# Patient Record
Sex: FEMALE | Race: WHITE | NOT HISPANIC OR LATINO | Employment: FULL TIME | URBAN - METROPOLITAN AREA
[De-identification: names, ages, dates, MRNs, and addresses within clinical notes are randomized per-mention and may not be internally consistent; named-entity substitution may affect disease eponyms.]

---

## 2017-04-11 ENCOUNTER — HOSPITAL ENCOUNTER (OUTPATIENT)
Dept: RADIOLOGY | Facility: HOSPITAL | Age: 42
Discharge: HOME | End: 2017-04-11
Attending: OBSTETRICS & GYNECOLOGY
Payer: COMMERCIAL

## 2017-04-11 DIAGNOSIS — Z12.31 VISIT FOR SCREENING MAMMOGRAM: ICD-10-CM

## 2017-04-11 PROCEDURE — 77067 SCR MAMMO BI INCL CAD: CPT | Mod: TC

## 2018-06-04 ENCOUNTER — OFFICE VISIT (OUTPATIENT)
Dept: PODIATRY | Facility: CLINIC | Age: 43
End: 2018-06-04
Payer: COMMERCIAL

## 2018-06-04 VITALS
HEART RATE: 82 BPM | SYSTOLIC BLOOD PRESSURE: 152 MMHG | DIASTOLIC BLOOD PRESSURE: 94 MMHG | WEIGHT: 270 LBS | HEIGHT: 67 IN | BODY MASS INDEX: 42.38 KG/M2

## 2018-06-04 DIAGNOSIS — S99.922A INJURY OF PLANTAR PLATE, LEFT, INITIAL ENCOUNTER: ICD-10-CM

## 2018-06-04 DIAGNOSIS — M62.469 GASTROCNEMIUS EQUINUS, UNSPECIFIED LATERALITY: ICD-10-CM

## 2018-06-04 DIAGNOSIS — G57.53 TARSAL TUNNEL SYNDROME OF BOTH LOWER EXTREMITIES: ICD-10-CM

## 2018-06-04 DIAGNOSIS — M79.671 BILATERAL FOOT PAIN: Primary | ICD-10-CM

## 2018-06-04 DIAGNOSIS — M79.672 BILATERAL FOOT PAIN: Primary | ICD-10-CM

## 2018-06-04 DIAGNOSIS — R20.0 NUMBNESS AND TINGLING OF FOOT: ICD-10-CM

## 2018-06-04 DIAGNOSIS — Z87.39 HISTORY OF BACK PAIN: ICD-10-CM

## 2018-06-04 DIAGNOSIS — R20.2 NUMBNESS AND TINGLING OF FOOT: ICD-10-CM

## 2018-06-04 DIAGNOSIS — M20.5X2 HALLUX LIMITUS, LEFT: ICD-10-CM

## 2018-06-04 DIAGNOSIS — Z98.890 HISTORY OF FOOT SURGERY: ICD-10-CM

## 2018-06-04 PROCEDURE — 99999 PR PBB SHADOW E&M-EST. PATIENT-LVL III: CPT | Mod: PBBFAC,,, | Performed by: PODIATRIST

## 2018-06-04 PROCEDURE — 99203 OFFICE O/P NEW LOW 30 MIN: CPT | Mod: S$GLB,,, | Performed by: PODIATRIST

## 2018-06-04 PROCEDURE — 3008F BODY MASS INDEX DOCD: CPT | Mod: CPTII,S$GLB,, | Performed by: PODIATRIST

## 2018-06-04 RX ORDER — ESTRADIOL 2 MG/1
TABLET ORAL
COMMUNITY
Start: 2017-04-14 | End: 2020-01-31 | Stop reason: SDUPTHER

## 2018-06-04 RX ORDER — LEVOTHYROXINE SODIUM 75 UG/1
TABLET ORAL
COMMUNITY
Start: 2017-04-14 | End: 2022-12-28 | Stop reason: ALTCHOICE

## 2018-06-04 RX ORDER — HYDROCHLOROTHIAZIDE 12.5 MG/1
CAPSULE ORAL
COMMUNITY
Start: 2017-04-05 | End: 2020-01-31 | Stop reason: SDUPTHER

## 2018-06-04 RX ORDER — TRAZODONE HYDROCHLORIDE 50 MG/1
TABLET ORAL
COMMUNITY
Start: 2017-04-14 | End: 2020-01-31 | Stop reason: SDUPTHER

## 2018-06-04 NOTE — PROGRESS NOTES
"Subjective:      Patient ID: Isi Herrera is a 42 y.o. female.    Chief Complaint: Numbness (Bilateral)    History right foot surgery bunionectomy per Dr. Guzman with subsequent surgery per Dr. Johnson consisting of hardware removal with implantation of silastic toe spacer in great toe joint. Both physicians in [place]. C/o protruding implant right foot. C/o numbness along bottom of both feet mostly at night but also acts up during the day. History of bulging disk in low back with pain improving. Followed [facility] in [place], [place].    Vitals:    06/04/18 1020   BP: (!) 152/94   Pulse: 82   Weight: 122.5 kg (270 lb)   Height: 5' 7" (1.702 m)   PainSc: 0-No pain      History reviewed. No pertinent past medical history.    Past Surgical History:   Procedure Laterality Date    BUNIONECTOMY Right 2009    HYSTERECTOMY         Family History   Problem Relation Age of Onset    Graves' disease Mother     Arthritis Father     Diabetes Sister        Social History     Social History    Marital status:      Spouse name: N/A    Number of children: N/A    Years of education: N/A     Social History Main Topics    Smoking status: Never Smoker    Smokeless tobacco: None    Alcohol use No    Drug use: No    Sexual activity: Yes     Other Topics Concern    None     Social History Narrative    None       Current Outpatient Prescriptions   Medication Sig Dispense Refill    estradiol (ESTRACE) 2 MG tablet       hydroCHLOROthiazide (MICROZIDE) 12.5 mg capsule       levothyroxine (SYNTHROID) 75 MCG tablet       traZODone (DESYREL) 50 MG tablet        No current facility-administered medications for this visit.        Review of patient's allergies indicates:  No Known Allergies      Review of Systems   Constitution: Negative for chills, fever, weakness and malaise/fatigue.   Cardiovascular: Negative for chest pain, claudication and leg swelling.   Respiratory: Negative for cough and shortness of " breath.    Skin: Negative for color change, itching, poor wound healing and rash.   Musculoskeletal: Positive for back pain. Negative for joint pain, muscle cramps and muscle weakness.   Gastrointestinal: Negative for nausea and vomiting.   Neurological: Positive for numbness. Negative for paresthesias.   Psychiatric/Behavioral: Negative for altered mental status.           Objective:      Physical Exam   Constitutional: She is oriented to person, place, and time. She appears well-developed and well-nourished. No distress.   Cardiovascular:   Pulses:       Dorsalis pedis pulses are 2+ on the right side, and 2+ on the left side.        Posterior tibial pulses are 2+ on the right side, and 2+ on the left side.   CFT< 3 secs all toes bilateral foot, skin temp warm bilateral foot, diminished digital hair growth bilateral foot, no lower extremity edema bilateral.     Musculoskeletal:        Feet:    - 20 deg DF with knee extended bilateral that reduces to 0 deg DF with knee flexed.    + Tinel's sign with percussion of TT that radiates to her toes bilateral.     Left 1 MTP achieves 20 deg DF. Dorsal medial palpable bony prominence first met head left.    Medial deviation of toes 2-5 bilateral foot.   Neurological: She is alert and oriented to person, place, and time. She has normal strength. No sensory deficit.   Skin: Skin is warm, dry and intact. Capillary refill takes less than 2 seconds. No ecchymosis and no rash noted. She is not diaphoretic. No cyanosis or erythema. No pallor. Nails show no clubbing.   Skin turgor and elasticity WNLs bilateral lower extremity    No open lesions or macerations bilateral lower extremity.               Assessment:       Encounter Diagnoses   Name Primary?    Bilateral foot pain Yes    Numbness and tingling of foot     Tarsal tunnel syndrome of both lower extremities     History of back pain     History of foot surgery - Right Foot     Hallux limitus, left     Injury of plantar  "plate, left, initial encounter     Gastrocnemius equinus, unspecified laterality          Plan:       Isi was seen today for numbness.    Diagnoses and all orders for this visit:    Bilateral foot pain  -     X-Ray Foot Complete 3 view Bilateral; Future    Numbness and tingling of foot  -     Ambulatory Referral to Neurology    Tarsal tunnel syndrome of both lower extremities  -     X-Ray Foot Complete 3 view Bilateral; Future    History of back pain  -     Ambulatory Referral to Neurology    History of foot surgery - Right Foot  -     X-Ray Foot Complete 3 view Bilateral; Future    Hallux limitus, left  -     X-Ray Foot Complete 3 view Bilateral; Future    Injury of plantar plate, left, initial encounter  -     X-Ray Foot Complete 3 view Bilateral; Future    Gastrocnemius equinus, unspecified laterality      I counseled the patient on her conditions, their implications and medical management.    Dispensed literature on and demonstrated calf muscle stretches. Reviewed appropriate shoe gear and discussed activity modification such as avoiding flat shoes and barefoot walking. Recommend 1/2-1" heel height.    Refer to Neurology for second opinion on etiology of bilateral foot numbness. Clinically may be double crush syndrome vs isolated lumbar sacral disease or tarsal tunnel.    X-ray bilateral foot to assess underlying disease for possible surgical intervention.    RTC 2-3 weeks or prn.    .         "

## 2018-06-05 ENCOUNTER — HOSPITAL ENCOUNTER (OUTPATIENT)
Dept: RADIOLOGY | Facility: HOSPITAL | Age: 43
Discharge: HOME | End: 2018-06-05
Attending: PODIATRIST
Payer: COMMERCIAL

## 2018-06-05 DIAGNOSIS — M20.5X2 HALLUX LIMITUS, LEFT: ICD-10-CM

## 2018-06-05 DIAGNOSIS — M79.672 BILATERAL FOOT PAIN: ICD-10-CM

## 2018-06-05 DIAGNOSIS — M79.671 BILATERAL FOOT PAIN: ICD-10-CM

## 2018-06-05 DIAGNOSIS — G57.53 TARSAL TUNNEL SYNDROME OF BOTH LOWER EXTREMITIES: ICD-10-CM

## 2018-06-05 DIAGNOSIS — S99.922A INJURY OF PLANTAR PLATE, LEFT, INITIAL ENCOUNTER: ICD-10-CM

## 2018-06-05 DIAGNOSIS — Z98.890 HISTORY OF FOOT SURGERY: ICD-10-CM

## 2018-06-05 PROCEDURE — 73630 X-RAY EXAM OF FOOT: CPT | Mod: 50,TC,FY

## 2018-06-05 PROCEDURE — 73630 X-RAY EXAM OF FOOT: CPT | Mod: 26,50,, | Performed by: RADIOLOGY

## 2018-06-15 ENCOUNTER — OFFICE VISIT (OUTPATIENT)
Dept: PODIATRY | Facility: CLINIC | Age: 43
End: 2018-06-15
Payer: COMMERCIAL

## 2018-06-15 ENCOUNTER — LAB VISIT (OUTPATIENT)
Dept: LAB | Facility: HOSPITAL | Age: 43
End: 2018-06-15
Attending: PODIATRIST
Payer: COMMERCIAL

## 2018-06-15 VITALS
BODY MASS INDEX: 42.38 KG/M2 | SYSTOLIC BLOOD PRESSURE: 135 MMHG | HEART RATE: 71 BPM | HEIGHT: 67 IN | WEIGHT: 270 LBS | DIASTOLIC BLOOD PRESSURE: 81 MMHG

## 2018-06-15 DIAGNOSIS — Z01.818 PREOP EXAMINATION: ICD-10-CM

## 2018-06-15 DIAGNOSIS — Z98.890 HISTORY OF FOOT SURGERY: ICD-10-CM

## 2018-06-15 DIAGNOSIS — M89.9 DISORDER OF BONE: ICD-10-CM

## 2018-06-15 DIAGNOSIS — M20.21 HALLUX RIGIDUS, RIGHT FOOT: Primary | ICD-10-CM

## 2018-06-15 DIAGNOSIS — M19.071 ARTHROSIS OF FOOT, RIGHT: ICD-10-CM

## 2018-06-15 LAB — 25(OH)D3+25(OH)D2 SERPL-MCNC: 56 NG/ML

## 2018-06-15 PROCEDURE — 36415 COLL VENOUS BLD VENIPUNCTURE: CPT | Mod: PO

## 2018-06-15 PROCEDURE — 99999 PR PBB SHADOW E&M-EST. PATIENT-LVL IV: CPT | Mod: PBBFAC,,, | Performed by: PODIATRIST

## 2018-06-15 PROCEDURE — 99214 OFFICE O/P EST MOD 30 MIN: CPT | Mod: S$GLB,,, | Performed by: PODIATRIST

## 2018-06-15 PROCEDURE — 82306 VITAMIN D 25 HYDROXY: CPT

## 2018-06-15 PROCEDURE — 3008F BODY MASS INDEX DOCD: CPT | Mod: CPTII,S$GLB,, | Performed by: PODIATRIST

## 2018-06-15 RX ORDER — LIDOCAINE HYDROCHLORIDE 10 MG/ML
1 INJECTION, SOLUTION EPIDURAL; INFILTRATION; INTRACAUDAL; PERINEURAL ONCE
Status: CANCELLED | OUTPATIENT
Start: 2018-06-15 | End: 2018-06-15

## 2018-06-17 NOTE — PROGRESS NOTES
"Subjective:      Patient ID: Isi Herrera is a 42 y.o. female.    Chief Complaint: Follow-up (bilateral foot pain)    History right foot surgery bunionectomy per Dr. Guzman with subsequent surgery per Dr. Johnson consisting of hardware removal with implantation of silastic toe spacer in great toe joint. Both physicians in [place]. C/o protruding implant right foot. C/o numbness along bottom of both feet mostly at night but also acts up during the day. History of bulging disk in low back with pain improving. Followed [facility] in [place], [place].    Vitals:    06/15/18 1014   BP: 135/81   Pulse: 71   Weight: 122.5 kg (270 lb)   Height: 5' 7" (1.702 m)      No past medical history on file.    Past Surgical History:   Procedure Laterality Date    BUNIONECTOMY Right 2009    HYSTERECTOMY         Family History   Problem Relation Age of Onset    Graves' disease Mother     Arthritis Father     Diabetes Sister        Social History     Social History    Marital status:      Spouse name: N/A    Number of children: N/A    Years of education: N/A     Social History Main Topics    Smoking status: Never Smoker    Smokeless tobacco: Not on file    Alcohol use No    Drug use: No    Sexual activity: Yes     Other Topics Concern    Not on file     Social History Narrative    No narrative on file       Current Outpatient Prescriptions   Medication Sig Dispense Refill    estradiol (ESTRACE) 2 MG tablet       hydroCHLOROthiazide (MICROZIDE) 12.5 mg capsule       levothyroxine (SYNTHROID) 75 MCG tablet       traZODone (DESYREL) 50 MG tablet        No current facility-administered medications for this visit.        Review of patient's allergies indicates:  No Known Allergies      Review of Systems   Constitution: Negative for chills, fever, weakness and malaise/fatigue.   Cardiovascular: Negative for chest pain, claudication and leg swelling.   Respiratory: Negative for cough and shortness of " breath.    Skin: Negative for color change, itching, poor wound healing and rash.   Musculoskeletal: Positive for back pain. Negative for joint pain, muscle cramps and muscle weakness.   Gastrointestinal: Negative for nausea and vomiting.   Neurological: Positive for numbness. Negative for paresthesias.   Psychiatric/Behavioral: Negative for altered mental status.           Objective:      Physical Exam   Constitutional: She is oriented to person, place, and time. She appears well-developed and well-nourished. No distress.   Cardiovascular:   Pulses:       Dorsalis pedis pulses are 2+ on the right side, and 2+ on the left side.        Posterior tibial pulses are 2+ on the right side, and 2+ on the left side.   CFT< 3 secs all toes bilateral foot, skin temp warm bilateral foot, diminished digital hair growth bilateral foot, no lower extremity edema bilateral.     Musculoskeletal:        Feet:    - 20 deg DF with knee extended bilateral that reduces to 0 deg DF with knee flexed.    + Tinel's sign with percussion of TT that radiates to her toes bilateral.     Left 1 MTP achieves 20 deg DF. Dorsal medial palpable bony prominence first met head left.    Medial deviation of toes 2-5 bilateral foot.   Neurological: She is alert and oriented to person, place, and time. She has normal strength. No sensory deficit.   Skin: Skin is warm, dry and intact. Capillary refill takes less than 2 seconds. No ecchymosis and no rash noted. She is not diaphoretic. No cyanosis or erythema. No pallor. Nails show no clubbing.   Skin turgor and elasticity WNLs bilateral lower extremity    No open lesions or macerations bilateral lower extremity.               Assessment:       Encounter Diagnoses   Name Primary?    Hallux rigidus, right foot Yes    Arthrosis of foot, right     History of foot surgery - Right Foot     Preop examination     Disorder of bone          Plan:       Isi was seen today for follow-up.    Diagnoses and all  orders for this visit:    Hallux rigidus, right foot  -     Ambulatory referral to [facility]  -     Case Request Operating Room: FUSION, MTP JOINT  -     Place in Outpatient; Standing  -     Verify surgical site; Standing  -     Verify informed consent; Standing    Arthrosis of foot, right  -     Ambulatory referral to Family Practice  -     Case Request Operating Room: FUSION, MTP JOINT  -     Place in Outpatient; Standing  -     Verify surgical site; Standing  -     Verify informed consent; Standing    History of foot surgery - Right Foot  -     Ambulatory referral to [facility]  -     Case Request Operating Room: FUSION, MTP JOINT  -     Place in Outpatient; Standing  -     Verify surgical site; Standing  -     Verify informed consent; Standing    Preop examination  -     Ambulatory referral to [facility]    Disorder of bone  -     VITAMIN D; Future    Other orders  -     lidocaine (PF) 10 mg/ml (1%) injection 10 mg; Inject 1 mL (10 mg total) into the skin once.  -     ceFAZolin (ANCEF) 2 g in dextrose 5 % 50 mL IVPB; Inject 2 g into the vein On call Procedure (Surgery).      I counseled the patient on her conditions, their implications and medical management.    Reviewed right foot xray noting daphney metallic implant inserted to base of proximal phalanx left hallux with erosion of the central and lateral first met head, obliteration of the joint space and varus deviation of the toe.    Discussed continued conservative care vs surgical intervention consisting of implant removal and 1 MTP arthrodesis with possible autograft implantation from the calcaneus in detail. Associated risks, benefits and postop course discussed. No guarantees given or implied.    This procedure has been fully reviewed with the patient and written informed consent has been obtained.    Referred to PCP for medical optimization and risk stratification    Scheduled at [facility] with local on 8/15/18.    RTC 1 week  postop    .

## 2018-07-10 DIAGNOSIS — Z12.31 VISIT FOR SCREENING MAMMOGRAM: Primary | ICD-10-CM

## 2018-07-19 ENCOUNTER — HOSPITAL ENCOUNTER (OUTPATIENT)
Dept: RADIOLOGY | Facility: HOSPITAL | Age: 43
Discharge: HOME | End: 2018-07-19
Attending: OBSTETRICS & GYNECOLOGY
Payer: COMMERCIAL

## 2018-07-19 DIAGNOSIS — Z12.31 VISIT FOR SCREENING MAMMOGRAM: ICD-10-CM

## 2018-07-19 PROCEDURE — 77063 BREAST TOMOSYNTHESIS BI: CPT | Mod: 26,,, | Performed by: RADIOLOGY

## 2018-07-19 PROCEDURE — 77067 SCR MAMMO BI INCL CAD: CPT | Mod: 26,,, | Performed by: RADIOLOGY

## 2018-07-19 PROCEDURE — 77063 BREAST TOMOSYNTHESIS BI: CPT | Mod: TC

## 2018-08-13 ENCOUNTER — HOSPITAL ENCOUNTER (OUTPATIENT)
Dept: PREADMISSION TESTING | Facility: HOSPITAL | Age: 43
Discharge: HOME | End: 2018-08-13
Attending: PODIATRIST
Payer: COMMERCIAL

## 2018-08-13 ENCOUNTER — ANESTHESIA EVENT (OUTPATIENT)
Dept: SURGERY | Facility: HOSPITAL | Age: 43
End: 2018-08-13
Payer: COMMERCIAL

## 2018-08-13 VITALS
HEART RATE: 73 BPM | OXYGEN SATURATION: 95 % | DIASTOLIC BLOOD PRESSURE: 93 MMHG | RESPIRATION RATE: 18 BRPM | BODY MASS INDEX: 42.64 KG/M2 | WEIGHT: 271.69 LBS | TEMPERATURE: 99 F | HEIGHT: 67 IN | SYSTOLIC BLOOD PRESSURE: 138 MMHG

## 2018-08-13 DIAGNOSIS — Z01.818 PRE-OP TESTING: Primary | ICD-10-CM

## 2018-08-13 RX ORDER — LIDOCAINE HYDROCHLORIDE 10 MG/ML
1 INJECTION, SOLUTION EPIDURAL; INFILTRATION; INTRACAUDAL; PERINEURAL ONCE
Status: CANCELLED | OUTPATIENT
Start: 2018-08-13 | End: 2018-08-13

## 2018-08-13 RX ORDER — SODIUM CHLORIDE, SODIUM LACTATE, POTASSIUM CHLORIDE, CALCIUM CHLORIDE 600; 310; 30; 20 MG/100ML; MG/100ML; MG/100ML; MG/100ML
INJECTION, SOLUTION INTRAVENOUS CONTINUOUS
Status: CANCELLED | OUTPATIENT
Start: 2018-08-13

## 2018-08-13 NOTE — DISCHARGE INSTRUCTIONS
· Arrive on  8/15/2018  at 5:30 A.M.  · Report to the 2nd floor Procedural Check In Room .   · Nothing to eat or drink after midnight the night before your procedure.  · Take the LEVOTHYROXINE medications the morning of surgery with a small sip of water.                                                         · Please remove all body piercings and leave all your jewelery and valuables at home .  · Please remove your contact lenses.   · Wear loose comfortable clothing.  · You will not be able to drive that day, please make arrangement for transportation to and from your procedure.  · You cannot be alone for 24 hours after anesthesia. Make arrangements as needed.  · Shower the night before your procedure and the morning of your procedure with Hibiclens antibacterial solution.  · No lotions, creams or powders  · Stop Aspirin, Ibuprofen, Advil, Motrin, Aleve, Nuprin, Naprosyn (all NSAID Medication) or any other blood thinners 5 days before your procedure unless directed by your physician.  Also hold all over the counter vitamins and herbal supplements for 5 days prior to your procedure.  · You may take Tylenol or Acetaminophen up the day of surgery for any pain.        Call [phone] with any questions.            Pre-Op Bathing Instructions    Before surgery, you can play an important role in your own health.    Because skin is not sterile, we need to be sure that your skin is as free of germs as possible. By following the instructions below, you can reduce the number of germs on your skin before surgery.    IMPORTANT: You will need to shower with a special soap called Hibiclens*, available at any pharmacy, over the counter usually in the first aid isle.  If you are allergic to Chlorhexidine (the antiseptic in Hibiclens), use an antibacterial soap such as Dial Soap for your preoperative showers.  You will shower with Hibiclens the night before and the morning of surgery. Both the night before your surgery and the  morning of your surgery see steps 2 and 3.   Do not use Hibiclens on the head, face or genitals to avoid injury to those areas.    STEP #1  1.  Shower with Hibiclens solution night before and the morning of surgery.      STEP #2: THE NIGHT BEFORE YOUR SURGERY     1. Do not shave the area of your body where your surgery will be performed.  2. Wash your hair as usual with your normal  Shampoo. Wash body shoulder to toes with your normal soap.  3. Squeeze Hibiclens into hand apply to surgical site.   4. Wash the site gently for five (5) minutes. Do not scrub your skin too hard.   5. Do not wash with your regular soap after Hibiclens is used.  6. Rinse your body thoroughly.  7. Pat yourself dry with a clean, soft towel.  8. Do not use lotion, cream, or powder.  9. Wear clean clothes.    STEP #3: THE MORNING OF YOUR SURGERY     1. Repeat Step #2.    * Not to be used by people allergic to Chlorhexidine.              Anesthesia: Monitored Anesthesia Care (MAC)    Youre due to have surgery. During surgery, youll be given medicine called anesthesia. This will keep you comfortable and pain-free. Your surgeon will use monitored anesthesia care (MAC). This sheet tells you more about this type of anesthesia.  What is monitored anesthesia care?  MAC keeps you very drowsy during surgery. You may be awake, but you will likely not remember much. And you wont feel pain. With MAC, medicines are given through an IV line into a vein in your arm or hand. A local anesthetic will usually be injected into the skin and muscle around the surgical site to numb it. The anesthesia provider monitors you during the procedure. He or she checks your heart rate and rhythm, blood pressure, and blood oxygen level.  Anesthesia tools and medicines that may be near you during your procedure  You will likely have:  · A pulse oximeter on the end of your finger. This measures your blood oxygen level.  · Electrocardiography leads (electrodes) on your  chest. These record your heart rate and rhythm.  · Medicines given through an IV. These relax you and prevent pain. You may be awake or sleep lightly. If you have local anesthetic, it is injected directly into your skin.  · A facemask to give you oxygen, if needed.  Risks and possible complications  MAC has some risks. These include:  · Breathing problems  · Nausea and vomiting  · Allergic reaction to the anesthetic    Anesthesia safety  Tips for anesthesia safety include the following:   · Follow all instructions you are given for how long not to eat or drink before your procedure.  · Be sure your healthcare provider knows what medicines you take, especially any anti-inflammatory medicine or blood thinners. This includes aspirin and any other over-the-counter medicines, herbs, and supplements.  · Have an adult family member or friend drive you home after the procedure.  · For the first 24 hours after your surgery:  ¨ Do not drive or use heavy equipment.  ¨ Do not make important decisions or sign documents.  ¨ Avoid alcohol.  ¨ Have someone stay with you, if possible. They can watch for problems and help keep you safe.  Date Last Reviewed: 12/1/2016  © 7903-3075 [employer]. [address] [place] [address]. All rights reserved. This information is not intended as a substitute for professional medical care. Always follow your healthcare professional's instructions.

## 2018-08-13 NOTE — PLAN OF CARE
Pt states she does not remember being told by Dr. Centeno or his staff to obtain medical clearance/risk stratification prior to surgery. Pt informed it is necessary to have prior to surgery. She will see PCP tomorrow and have them fax same to us. Stressed importance to pt of receiving the clearance tomorrow since surgery is very early AM on Wednesday. She verbalized understanding. Written note given to pt with what we need and fax and phone #'s to [facility].

## 2018-08-13 NOTE — ANESTHESIA PREPROCEDURE EVALUATION
08/15/2018  Isi Herrera is a 42 y.o., female. FUSION, MTP JOINT (Right Foot) - mini c-arm, possible autograft bone harvest from calcaneus, Arthrex locking plate    Past Medical History:   Diagnosis Date    Thyroid disease     Hypothyroid     Past Surgical History:   Procedure Laterality Date    BUNIONECTOMY Right 2009    HEMORRHOID SURGERY      HYSTERECTOMY      full 2009       Review of patient's allergies indicates:  No Known Allergies    BMP  Lab Results   Component Value Date     08/13/2018    K 3.5 08/13/2018     08/13/2018    CO2 28 08/13/2018    BUN 13 08/13/2018    CREATININE 0.8 08/13/2018    CALCIUM 9.6 08/13/2018    ANIONGAP 9 08/13/2018    ESTGFRAFRICA >60 08/13/2018    EGFRNONAA >60 08/13/2018       Lab Results   Component Value Date    WBC 8.84 08/13/2018    HGB 13.1 08/13/2018    HCT 38.4 08/13/2018    MCV 85 08/13/2018     08/13/2018         Anesthesia Evaluation    I have reviewed the Patient Summary Reports.     I have reviewed the Medications.     Review of Systems  Social:  Former Smoker, Social Alcohol Use 10 pack years   Hematology/Oncology:  Hematology Normal        Cardiovascular:   Exercise tolerance: good Hypertension, well controlled    Pulmonary:  Pulmonary Normal    Renal/:  Renal/ Normal     Hepatic/GI:  Hepatic/GI Normal    Neurological:  Neurology Normal    Endocrine:   Hypothyroidism      Wt Readings from Last 1 Encounters:   08/15/18 123.2 kg (271 lb 11.5 oz)     Temp Readings from Last 1 Encounters:   08/15/18 36.8 °C (98.2 °F) (Skin)     BP Readings from Last 1 Encounters:   08/15/18 (!) 140/71     Pulse Readings from Last 1 Encounters:   08/15/18 76     SpO2 Readings from Last 1 Encounters:   08/15/18 95%       Physical Exam  General:  Well nourished    Airway/Jaw/Neck:  Airway Findings: Mouth Opening: Normal Tongue: Normal  Mallampati:  "II  Improves to II with phonation.  TM Distance: Normal, at least 6 cm  Jaw/Neck Findings:  Neck ROM: Normal ROM      Dental:  Dental Findings: In tact   Chest/Lungs:  Chest/Lungs Findings: Clear to auscultation, Normal Respiratory Rate     Heart/Vascular:  Heart Findings: Rate: Normal  Rhythm: Regular Rhythm  Sounds: Normal        Mental Status:  Mental Status Findings:  Cooperative, Alert and Oriented         Anesthesia Plan  Type of Anesthesia, risks & benefits discussed:  Anesthesia Type:  MAC, general  Patient's Preference:   Intra-op Monitoring Plan: standard ASA monitors  Intra-op Monitoring Plan Comments:   Post Op Pain Control Plan: multimodal analgesia and per primary service following discharge from PACU  Post Op Pain Control Plan Comments:   Induction:   IV  Beta Blocker:  Patient is not currently on a Beta-Blocker (No further documentation required).       Informed Consent: Patient understands risks and agrees with Anesthesia plan.  Questions answered. Anesthesia consent signed with patient.  ASA Score: 1     Day of Surgery Review of History & Physical:  There are no significant changes.  H&P update referred to the surgeon.     Anesthesia Plan Notes: [id number]   - NPO   - only Rx is thyroxine   - "not pregnant"; hysterectomy   - Medical clearance with PCP documented in paper chart        Ready For Surgery From Anesthesia Perspective.       "

## 2018-08-14 NOTE — PLAN OF CARE
Spoke to Naty at [facility] ([phone]) regarding medica clearance. Pt has appointment today at 1630. Asked her to fax over clearance as soon as possible, due to short time frame.Confirmed fax number with her.

## 2018-08-15 ENCOUNTER — HOSPITAL ENCOUNTER (OUTPATIENT)
Facility: HOSPITAL | Age: 43
Discharge: HOME | End: 2018-08-15
Attending: PODIATRIST | Admitting: PODIATRIST
Payer: COMMERCIAL

## 2018-08-15 ENCOUNTER — ANESTHESIA (OUTPATIENT)
Dept: SURGERY | Facility: HOSPITAL | Age: 43
End: 2018-08-15
Payer: COMMERCIAL

## 2018-08-15 DIAGNOSIS — M20.21 HALLUX RIGIDUS, RIGHT FOOT: ICD-10-CM

## 2018-08-15 DIAGNOSIS — Z98.890 POSTOPERATIVE STATE: Primary | ICD-10-CM

## 2018-08-15 DIAGNOSIS — Z98.890 HISTORY OF FOOT SURGERY: ICD-10-CM

## 2018-08-15 DIAGNOSIS — M19.071 ARTHROSIS OF FOOT, RIGHT: ICD-10-CM

## 2018-08-15 LAB
GRAM STN SPEC: NORMAL
GRAM STN SPEC: NORMAL

## 2018-08-15 PROCEDURE — 20670 REMOVAL IMPLANT SUPERFICIAL: CPT | Mod: 51,RT,, | Performed by: PODIATRIST

## 2018-08-15 PROCEDURE — 87205 SMEAR GRAM STAIN: CPT

## 2018-08-15 PROCEDURE — 87176 TISSUE HOMOGENIZATION CULTR: CPT

## 2018-08-15 PROCEDURE — C1769 GUIDE WIRE: HCPCS | Performed by: PODIATRIST

## 2018-08-15 PROCEDURE — 37000009 HC ANESTHESIA EA ADD 15 MINS: Performed by: PODIATRIST

## 2018-08-15 PROCEDURE — 63600175 PHARM REV CODE 636 W HCPCS: Performed by: PODIATRIST

## 2018-08-15 PROCEDURE — 36000708 HC OR TIME LEV III 1ST 15 MIN: Performed by: PODIATRIST

## 2018-08-15 PROCEDURE — 27800903 OPTIME MED/SURG SUP & DEVICES OTHER IMPLANTS: Performed by: PODIATRIST

## 2018-08-15 PROCEDURE — 88305 TISSUE EXAM BY PATHOLOGIST: CPT | Mod: 26,,, | Performed by: PATHOLOGY

## 2018-08-15 PROCEDURE — 88311 DECALCIFY TISSUE: CPT | Mod: 26,,, | Performed by: PATHOLOGY

## 2018-08-15 PROCEDURE — C1713 ANCHOR/SCREW BN/BN,TIS/BN: HCPCS | Performed by: PODIATRIST

## 2018-08-15 PROCEDURE — 63600175 PHARM REV CODE 636 W HCPCS: Performed by: NURSE ANESTHETIST, CERTIFIED REGISTERED

## 2018-08-15 PROCEDURE — 71000016 HC POSTOP RECOV ADDL HR: Performed by: PODIATRIST

## 2018-08-15 PROCEDURE — 27201423 OPTIME MED/SURG SUP & DEVICES STERILE SUPPLY: Performed by: PODIATRIST

## 2018-08-15 PROCEDURE — 87070 CULTURE OTHR SPECIMN AEROBIC: CPT

## 2018-08-15 PROCEDURE — 87102 FUNGUS ISOLATION CULTURE: CPT

## 2018-08-15 PROCEDURE — 37000008 HC ANESTHESIA 1ST 15 MINUTES: Performed by: PODIATRIST

## 2018-08-15 PROCEDURE — 88311 DECALCIFY TISSUE: CPT | Performed by: PATHOLOGY

## 2018-08-15 PROCEDURE — 71000033 HC RECOVERY, INTIAL HOUR: Performed by: PODIATRIST

## 2018-08-15 PROCEDURE — 25000003 PHARM REV CODE 250: Performed by: PODIATRIST

## 2018-08-15 PROCEDURE — 71000015 HC POSTOP RECOV 1ST HR: Performed by: PODIATRIST

## 2018-08-15 PROCEDURE — 25000003 PHARM REV CODE 250: Performed by: STUDENT IN AN ORGANIZED HEALTH CARE EDUCATION/TRAINING PROGRAM

## 2018-08-15 PROCEDURE — 87075 CULTR BACTERIA EXCEPT BLOOD: CPT

## 2018-08-15 PROCEDURE — 28750 FUSION OF BIG TOE JOINT: CPT | Mod: T5,,, | Performed by: PODIATRIST

## 2018-08-15 PROCEDURE — 20902 REMOVAL OF BONE FOR GRAFT: CPT | Mod: 51,RT,, | Performed by: PODIATRIST

## 2018-08-15 PROCEDURE — 87206 SMEAR FLUORESCENT/ACID STAI: CPT

## 2018-08-15 PROCEDURE — 87116 MYCOBACTERIA CULTURE: CPT

## 2018-08-15 PROCEDURE — 36000709 HC OR TIME LEV III EA ADD 15 MIN: Performed by: PODIATRIST

## 2018-08-15 PROCEDURE — 25000003 PHARM REV CODE 250: Performed by: NURSE ANESTHETIST, CERTIFIED REGISTERED

## 2018-08-15 DEVICE — IMPLANTABLE DEVICE: Type: IMPLANTABLE DEVICE | Site: TOE | Status: FUNCTIONAL

## 2018-08-15 DEVICE — SCREW LP TITANIUM 3X20MM: Type: IMPLANTABLE DEVICE | Site: TOE | Status: FUNCTIONAL

## 2018-08-15 DEVICE — SCREW LP VA 3X12MM LOK TI: Type: IMPLANTABLE DEVICE | Site: TOE | Status: FUNCTIONAL

## 2018-08-15 DEVICE — MATRIX BONE STIMUBLAST 1ML GEL: Type: IMPLANTABLE DEVICE | Site: TOE | Status: FUNCTIONAL

## 2018-08-15 DEVICE — SCREW LP VA 3X14MM LOK TI: Type: IMPLANTABLE DEVICE | Site: TOE | Status: FUNCTIONAL

## 2018-08-15 RX ORDER — KETOROLAC TROMETHAMINE 30 MG/ML
INJECTION, SOLUTION INTRAMUSCULAR; INTRAVENOUS
Status: DISCONTINUED | OUTPATIENT
Start: 2018-08-15 | End: 2018-08-15

## 2018-08-15 RX ORDER — LIDOCAINE HYDROCHLORIDE 10 MG/ML
INJECTION, SOLUTION EPIDURAL; INFILTRATION; INTRACAUDAL; PERINEURAL
Status: DISCONTINUED | OUTPATIENT
Start: 2018-08-15 | End: 2018-08-15 | Stop reason: HOSPADM

## 2018-08-15 RX ORDER — PROPOFOL 10 MG/ML
VIAL (ML) INTRAVENOUS
Status: DISCONTINUED | OUTPATIENT
Start: 2018-08-15 | End: 2018-08-15

## 2018-08-15 RX ORDER — ACETAMINOPHEN 10 MG/ML
INJECTION, SOLUTION INTRAVENOUS
Status: DISCONTINUED | OUTPATIENT
Start: 2018-08-15 | End: 2018-08-15

## 2018-08-15 RX ORDER — CEFAZOLIN SODIUM 2 G/50ML
2 SOLUTION INTRAVENOUS
Status: COMPLETED | OUTPATIENT
Start: 2018-08-15 | End: 2018-08-15

## 2018-08-15 RX ORDER — SODIUM CHLORIDE 0.9 % (FLUSH) 0.9 %
3 SYRINGE (ML) INJECTION
Status: DISCONTINUED | OUTPATIENT
Start: 2018-08-15 | End: 2018-08-15 | Stop reason: HOSPADM

## 2018-08-15 RX ORDER — BUPIVACAINE HYDROCHLORIDE 2.5 MG/ML
INJECTION, SOLUTION EPIDURAL; INFILTRATION; INTRACAUDAL
Status: DISCONTINUED | OUTPATIENT
Start: 2018-08-15 | End: 2018-08-15 | Stop reason: HOSPADM

## 2018-08-15 RX ORDER — EPHEDRINE SULFATE 50 MG/ML
INJECTION, SOLUTION INTRAVENOUS
Status: DISCONTINUED | OUTPATIENT
Start: 2018-08-15 | End: 2018-08-15

## 2018-08-15 RX ORDER — IBUPROFEN 800 MG/1
800 TABLET ORAL 3 TIMES DAILY
Qty: 30 TABLET | Refills: 1 | Status: SHIPPED | OUTPATIENT
Start: 2018-08-15 | End: 2022-11-21

## 2018-08-15 RX ORDER — HYDROMORPHONE HYDROCHLORIDE 2 MG/ML
0.5 INJECTION, SOLUTION INTRAMUSCULAR; INTRAVENOUS; SUBCUTANEOUS EVERY 5 MIN PRN
Status: ACTIVE | OUTPATIENT
Start: 2018-08-15 | End: 2018-08-15

## 2018-08-15 RX ORDER — ONDANSETRON 2 MG/ML
4 INJECTION INTRAMUSCULAR; INTRAVENOUS DAILY PRN
Status: DISCONTINUED | OUTPATIENT
Start: 2018-08-15 | End: 2018-08-15 | Stop reason: HOSPADM

## 2018-08-15 RX ORDER — LIDOCAINE HYDROCHLORIDE 10 MG/ML
1 INJECTION, SOLUTION EPIDURAL; INFILTRATION; INTRACAUDAL; PERINEURAL ONCE
Status: DISCONTINUED | OUTPATIENT
Start: 2018-08-15 | End: 2018-08-15

## 2018-08-15 RX ORDER — ONDANSETRON 2 MG/ML
INJECTION INTRAMUSCULAR; INTRAVENOUS
Status: DISCONTINUED | OUTPATIENT
Start: 2018-08-15 | End: 2018-08-15

## 2018-08-15 RX ORDER — LIDOCAINE HYDROCHLORIDE 10 MG/ML
1 INJECTION, SOLUTION EPIDURAL; INFILTRATION; INTRACAUDAL; PERINEURAL ONCE
Status: DISCONTINUED | OUTPATIENT
Start: 2018-08-15 | End: 2018-08-15 | Stop reason: HOSPADM

## 2018-08-15 RX ORDER — PROPOFOL 10 MG/ML
VIAL (ML) INTRAVENOUS CONTINUOUS PRN
Status: DISCONTINUED | OUTPATIENT
Start: 2018-08-15 | End: 2018-08-15

## 2018-08-15 RX ORDER — HYDROCODONE BITARTRATE AND ACETAMINOPHEN 5; 325 MG/1; MG/1
1 TABLET ORAL EVERY 4 HOURS PRN
Status: DISCONTINUED | OUTPATIENT
Start: 2018-08-15 | End: 2018-08-15 | Stop reason: HOSPADM

## 2018-08-15 RX ORDER — OXYCODONE AND ACETAMINOPHEN 5; 325 MG/1; MG/1
2 TABLET ORAL EVERY 4 HOURS PRN
Qty: 60 TABLET | Refills: 0 | Status: SHIPPED | OUTPATIENT
Start: 2018-08-15 | End: 2020-01-31

## 2018-08-15 RX ORDER — SODIUM CHLORIDE, SODIUM LACTATE, POTASSIUM CHLORIDE, CALCIUM CHLORIDE 600; 310; 30; 20 MG/100ML; MG/100ML; MG/100ML; MG/100ML
INJECTION, SOLUTION INTRAVENOUS CONTINUOUS
Status: DISCONTINUED | OUTPATIENT
Start: 2018-08-15 | End: 2018-08-15 | Stop reason: HOSPADM

## 2018-08-15 RX ORDER — DIPHENHYDRAMINE HYDROCHLORIDE 50 MG/ML
12.5 INJECTION INTRAMUSCULAR; INTRAVENOUS EVERY 6 HOURS PRN
Status: DISCONTINUED | OUTPATIENT
Start: 2018-08-15 | End: 2018-08-15 | Stop reason: HOSPADM

## 2018-08-15 RX ORDER — MIDAZOLAM HYDROCHLORIDE 1 MG/ML
INJECTION, SOLUTION INTRAMUSCULAR; INTRAVENOUS
Status: DISCONTINUED | OUTPATIENT
Start: 2018-08-15 | End: 2018-08-15

## 2018-08-15 RX ORDER — KETAMINE HYDROCHLORIDE 50 MG/ML
INJECTION, SOLUTION INTRAMUSCULAR; INTRAVENOUS
Status: DISCONTINUED | OUTPATIENT
Start: 2018-08-15 | End: 2018-08-15

## 2018-08-15 RX ORDER — DEXAMETHASONE SODIUM PHOSPHATE 4 MG/ML
INJECTION, SOLUTION INTRA-ARTICULAR; INTRALESIONAL; INTRAMUSCULAR; INTRAVENOUS; SOFT TISSUE
Status: DISCONTINUED | OUTPATIENT
Start: 2018-08-15 | End: 2018-08-15

## 2018-08-15 RX ADMIN — ACETAMINOPHEN 1000 MG: 10 INJECTION, SOLUTION INTRAVENOUS at 07:08

## 2018-08-15 RX ADMIN — SODIUM CHLORIDE, SODIUM LACTATE, POTASSIUM CHLORIDE, AND CALCIUM CHLORIDE: .6; .31; .03; .02 INJECTION, SOLUTION INTRAVENOUS at 06:08

## 2018-08-15 RX ADMIN — MIDAZOLAM 2 MG: 1 INJECTION INTRAMUSCULAR; INTRAVENOUS at 06:08

## 2018-08-15 RX ADMIN — PROPOFOL 20 MG: 10 INJECTION, EMULSION INTRAVENOUS at 07:08

## 2018-08-15 RX ADMIN — EPHEDRINE SULFATE 5 MG: 50 INJECTION, SOLUTION INTRAMUSCULAR; INTRAVENOUS; SUBCUTANEOUS at 07:08

## 2018-08-15 RX ADMIN — CEFAZOLIN SODIUM 2 G: 2 SOLUTION INTRAVENOUS at 07:08

## 2018-08-15 RX ADMIN — DEXAMETHASONE SODIUM PHOSPHATE 8 MG: 4 INJECTION, SOLUTION INTRAMUSCULAR; INTRAVENOUS at 07:08

## 2018-08-15 RX ADMIN — PROPOFOL 25 MCG/KG/MIN: 10 INJECTION, EMULSION INTRAVENOUS at 07:08

## 2018-08-15 RX ADMIN — KETAMINE HYDROCHLORIDE 20 MG: 50 INJECTION, SOLUTION, CONCENTRATE INTRAMUSCULAR; INTRAVENOUS at 07:08

## 2018-08-15 RX ADMIN — KETOROLAC TROMETHAMINE 30 MG: 30 INJECTION, SOLUTION INTRAMUSCULAR; INTRAVENOUS at 08:08

## 2018-08-15 RX ADMIN — ONDANSETRON 8 MG: 2 INJECTION, SOLUTION INTRAMUSCULAR; INTRAVENOUS at 07:08

## 2018-08-15 RX ADMIN — PROPOFOL 50 MG: 10 INJECTION, EMULSION INTRAVENOUS at 07:08

## 2018-08-15 RX ADMIN — EPHEDRINE SULFATE 5 MG: 50 INJECTION, SOLUTION INTRAMUSCULAR; INTRAVENOUS; SUBCUTANEOUS at 08:08

## 2018-08-15 RX ADMIN — PROPOFOL 200 MG: 10 INJECTION, EMULSION INTRAVENOUS at 07:08

## 2018-08-15 NOTE — BRIEF OP NOTE
[facility]  Brief Operative Note     SUMMARY     Surgery Date: 8/15/2018     Surgeon(s) and Role:     * Ace Centeno DPM - Primary     * Cristina Rutherford MD - Resident - Assisting        Pre-op Diagnosis:  History of foot surgery [Z98.890]  Arthrosis of foot, right [M19.071]  Hallux rigidus, right foot [M20.21]    Post-op Diagnosis:  Post-Op Diagnosis Codes:     * History of foot surgery [Z98.890]     * Arthrosis of foot, right [M19.071]     * Hallux rigidus, right foot [M20.21]    Procedure(s) (LRB):  FUSION, MTP JOINT (Right)   Harvesting of autograft bone from the right calcaneus and implantation into arthrodesis site right first metatarsal phalangeal joint right foot  Removal of implant right first metatarsal phalangeal joint right foot.    Anesthesia: General    Description of the findings of the procedure: Removal of implant right first metatarsal phalangeal joint right foot.   Harvesting of autograft bone from the right calcaneus and implantation into arthrodesis site right first metatarsal phalangeal joint right foot. Good alignment and stable fixation achieved.      Findings/Key Components: per above    Estimated Blood Loss: 1 mL         Specimens:   Specimen (12h ago, onward)    Start     Ordered    08/15/18 0840  Specimen to Pathology - Surgery  Once     Comments:  Pre-op Diagnosis: History of foot surgery [Z98.890]Arthrosis of foot, right [M19.071]Hallux rigidus, right foot [M20.21]Post-op Diagnosis: same Procedure(s):FUSION, MTP JOINT Number of specimens: 1Name of specimens: 1. Bone right big toe perm     Start Status   08/15/18 0840 Collected (08/15/18 0842)       08/15/18 0842          Discharge Note    SUMMARY     Admit Date: 8/15/2018    Discharge Date and Time:  08/15/2018 9:16 AM    Hospital Course (synopsis of major diagnoses, care, treatment, and services provided during the course of the hospital stay): admitted for outpatient procedure with uneventful stay         Final Diagnosis: Post-Op Diagnosis Codes:     * History of foot surgery [Z98.890]     * Arthrosis of foot, right [M19.071]     * Hallux rigidus, right foot [M20.21]    Disposition: Home or Self Care    Follow Up/Patient Instructions:     Medications:  Reconciled Home Medications:      Medication List      START taking these medications    ibuprofen 800 MG tablet  Commonly known as:  ADVIL,MOTRIN  Take 1 tablet (800 mg total) by mouth 3 (three) times daily.     oxyCODONE-acetaminophen 5-325 mg per tablet  Commonly known as:  PERCOCET  Take 2 tablets by mouth every 4 (four) hours as needed for Pain.        CONTINUE taking these medications    estradiol 2 MG tablet  Commonly known as:  ESTRACE     hydroCHLOROthiazide 12.5 mg capsule  Commonly known as:  MICROZIDE     levothyroxine 75 MCG tablet  Commonly known as:  SYNTHROID     traZODone 50 MG tablet  Commonly known as:  DESYREL     VITAMIN C DROPS 60 mg Lozg  Generic drug:  ascorbic acid (vitamin C)  Take by mouth.          Discharge Procedure Orders   Diet general     Keep surgical extremity elevated   Order Comments: Above heart level     Ice to affected area   Order Comments: Apply around right ankle region for 30 minute intervals throughout the day x 3-5 days.     Call MD for:  temperature >100.4     Call MD for:  persistent nausea and vomiting     Call MD for:  severe uncontrolled pain     Call MD for:  difficulty breathing, headache or visual disturbances     Leave dressing on - Keep it clean, dry, and intact until clinic visit   Order Comments: May loosen outer ace wrap if too tight.     Weight bearing restrictions (specify)   Order Comments: Partial weightbearing right foot with orthopedic boot and crutches.     Follow-up Information     Ace Centeno DPM In 2 weeks.    Specialty:  Podiatry  Contact information:  [address]  [place] [address] [phone]

## 2018-08-15 NOTE — H&P
SUBJECTIVE:     Procedure: implant removal with first metatarsal phalangeal joint arthrodesis right foot    Chief Complaint/Diagnosis: chronic pain right foot with history of daphney-implant arthroplasty    PTA Medications   Medication Sig    ascorbic acid, vitamin C, (VITAMIN C DROPS) 60 mg Lozg Take by mouth.    estradiol (ESTRACE) 2 MG tablet     hydroCHLOROthiazide (MICROZIDE) 12.5 mg capsule     levothyroxine (SYNTHROID) 75 MCG tablet     traZODone (DESYREL) 50 MG tablet        Review of patient's allergies indicates:  No Known Allergies    Past Medical History:   Diagnosis Date    Thyroid disease     Hypothyroid     Past Surgical History:   Procedure Laterality Date    BUNIONECTOMY Right 2009    HEMORRHOID SURGERY      HYSTERECTOMY      full 2009     Family History   Problem Relation Age of Onset    Graves' disease Mother     Arthritis Father     Diabetes Sister      Social History     Tobacco Use    Smoking status: Never Smoker   Substance Use Topics    Alcohol use: No    Drug use: No        Review of Systems:  Constitutional: no fever or chills, pain well controlled  Eyes: no visual changes  ENT: no nasal congestion or sore throat  Respiratory: no cough or shortness of breath  Cardiovascular: no chest pain or palpitations  Gastrointestinal: no nausea or vomiting, tolerating diet  Genitourinary: no hematuria or dysuria  Integument/Breast: no rash or pruritis  Hematologic/Lymphatic: no easy bruising or lymphadenopathy  Musculoskeletal: no arthralgias or myalgias  Neurological: no seizures or tremors  Behavioral/Psych: no auditory or visual hallucinations  Endocrine: no heat or cold intolerance    OBJECTIVE:     Vital Signs (Most Recent)  Temp: 98.2 °F (36.8 °C) (08/15/18 0615)  Pulse: 76 (08/15/18 0615)  Resp: 18 (08/15/18 0615)  BP: (!) 140/71 (08/15/18 0615)  SpO2: 95 % (08/15/18 0615)    Physical Exam:  General: well developed, appears stated age, no distress, mildly obese  Extremities:  warm, well perfused and no cyanosis or edema, or clubbing  Pulses: 2+ and symmetric  Skin: Skin color, texture, turgor normal. No rashes or lesions  Neurologic: Alert and oriented. Thought content appropriate     - 20 deg DF with knee extended bilateral that reduces to 0 deg DF with knee flexed.    + Tinel's sign with percussion of TT that radiates to her toes bilateral.    Left 1 MTP achieves 20 deg DF. Dorsal medial palpable bony prominence first met head left.

## 2018-08-15 NOTE — H&P
Isi Herrera is a 42 y.o., female. FUSION, MTP JOINT (Right Foot) - mini c-arm, possible autograft bone harvest from calcaneus, Arthrex locking plate          Past Medical History:   Diagnosis Date    Thyroid disease       Hypothyroid            Past Surgical History:   Procedure Laterality Date    BUNIONECTOMY Right 2009    HEMORRHOID SURGERY        HYSTERECTOMY         full 2009         Review of patient's allergies indicates:  No Known Allergies     BMP        Lab Results   Component Value Date      08/13/2018     K 3.5 08/13/2018      08/13/2018     CO2 28 08/13/2018     BUN 13 08/13/2018     CREATININE 0.8 08/13/2018     CALCIUM 9.6 08/13/2018     ANIONGAP 9 08/13/2018     ESTGFRAFRICA >60 08/13/2018     EGFRNONAA >60 08/13/2018               Lab Results   Component Value Date     WBC 8.84 08/13/2018     HGB 13.1 08/13/2018     HCT 38.4 08/13/2018     MCV 85 08/13/2018      08/13/2018            Anesthesia Evaluation    I have reviewed the Patient Summary Reports.     I have reviewed the Medications.      Review of Systems  Social:  Former Smoker, Social Alcohol Use 10 pack years   Hematology/Oncology:  Hematology Normal         Cardiovascular:   Exercise tolerance: good Hypertension, well controlled    Pulmonary:  Pulmonary Normal    Renal/:  Renal/ Normal     Hepatic/GI:  Hepatic/GI Normal    Neurological:  Neurology Normal    Endocrine:   Hypothyroidism           Wt Readings from Last 1 Encounters:   08/15/18 123.2 kg (271 lb 11.5 oz)          Temp Readings from Last 1 Encounters:   08/15/18 36.8 °C (98.2 °F) (Skin)          BP Readings from Last 1 Encounters:   08/15/18 (!) 140/71          Pulse Readings from Last 1 Encounters:   08/15/18 76          SpO2 Readings from Last 1 Encounters:   08/15/18 95%         Physical Exam  General:  Well nourished    Airway/Jaw/Neck:  Airway Findings: Mouth Opening: Normal Tongue: Normal  Mallampati: II  Improves to II with phonation.   TM Distance: Normal, at least 6 cm  Jaw/Neck Findings:  Neck ROM: Normal ROM      Dental:  Dental Findings: In tact   Chest/Lungs:  Chest/Lungs Findings: Clear to auscultation, Normal Respiratory Rate     Heart/Vascular:  Heart Findings: Rate: Normal  Rhythm: Regular Rhythm  Sounds: Normal        Mental Status:  Mental Status Findings:  Cooperative, Alert and Oriented           Plan  Proceed with surgery

## 2018-08-15 NOTE — ANESTHESIA POSTPROCEDURE EVALUATION
"Anesthesia Post Evaluation    Patient: Isi Herrera    Procedure(s) Performed: Procedure(s) (LRB):  FUSION, MTP JOINT (Right)    Final Anesthesia Type: general  Patient location during evaluation: PACU  Patient participation: Yes- Able to Participate  Level of consciousness: awake and alert, oriented and awake  Post-procedure vital signs: reviewed and stable  Pain management: adequate  Airway patency: patent  PONV status at discharge: No PONV  Anesthetic complications: no      Cardiovascular status: blood pressure returned to baseline  Respiratory status: unassisted and room air  Hydration status: euvolemic  Follow-up not needed.        Visit Vitals  /66   Pulse 82   Temp 36.7 °C (98.1 °F) (Skin)   Resp 17   Ht 5' 7" (1.702 m)   Wt 123.2 kg (271 lb 11.5 oz)   LMP  (LMP Unknown)   SpO2 96%   Breastfeeding? No   BMI 42.56 kg/m²       Pain/Sallie Score: Pain Assessment Performed: Yes (8/15/2018 10:30 AM)  Presence of Pain: denies (8/15/2018 11:40 AM)  Sallie Score: 10 (8/15/2018 11:40 AM)        "

## 2018-08-15 NOTE — PLAN OF CARE
..VSS  NAD  Discharge instructions given with claimed understanding by pt and family. Patient has ride home with family or friend. Claims pain level is _0____ at this time.  Has voided without difficulty if required by surgical type.

## 2018-08-15 NOTE — OP NOTE
Operative Note       Surgery Date: 8/15/2018     Surgeon(s) and Role:     * Ace Centeno DPM - Primary     * Cristina Rutherford MD - Resident - Assisting    Pre-op Diagnosis:  History of foot surgery [Z98.890]  Arthrosis of foot, right [M19.071]  Hallux rigidus, right foot [M20.21]    Post-op Diagnosis: Post-Op Diagnosis Codes:     * History of foot surgery [Z98.890]     * Arthrosis of foot, right [M19.071]     * Hallux rigidus, right foot [M20.21]    Procedure(s) (LRB):  FUSION, FIRST MTP JOINT (Right)   Harvesting of autograft bone from the right calcaneus and implantation into arthrodesis site right first metatarsal phalangeal joint right foot  Removal of implant right first metatarsal phalangeal joint right foot.        Anesthesia: General    Procedure in Detail/Findings:    The patient was brought to the operating room on a stretcher and placed on the operating table in a supine position. Following the successful induction of MAC anesthesia, a tourniquet was applied to the patients right calf. Following this, a local anesthetic block consisting of approximately 1:1 20 cc 1% lidocaine plain to 0.5% bupivacaine plain was injected as a proximal guajardo block and ankle block. Then, the right foot was scrubbed, prepped and draped in the usual aseptic manner. A marking pen was utilized to create a incision guide in a over the 1st MTP over the previous scar. A time out was performed and an esmarch was used to exsanguinate the foot. The tourniquet was inflated to 250 mmHg.       A #15 blade was used to make the skin incision over the 1st MTPJ down to the level of subcutaneous tissue on the medial aspect of the 1st ray. Care was taken to avoid damage to any neurovascular structures, which were reflected or cauterized as necessary. The incision was deepened down to the level of periosteum and capsule. This was incised longitudinally and reflected from the bone exposing the first metatarsal head and proximal  phalanx.  All soft tissue attachments to the head of the metatarsal were then resected sharply and a Mcglamry elevator was used to resect all soft tissue attachments on the plantar and medial/lateral aspect. This allowed for proper exposure. All soft tissue attachments were resected from the base of the proximal phalanx as well for proper exposure.     The daphney implant was then noted to the base of the proximal phalanx. Using a freer, the implant was loosened and removed from the bone. The surgical site was then copiously flushed with sterile saline.The 1st metatarsal head was then examined and noted with arthritic changes and over 65% loss of cartilage. Next, a 0.062 K wire was driven through the central aspect of the metatarsal head for placement of the reamer. The reamer was used to resect all the cartilage from the surface of the head of the metatarsal. The K wire was then removed and a sagittal saw used to resect the sclerotic bone from the base of the proximal phalanx to healthy appearing bone. Once all of the cartilage was removed, all surrounding ledges of bone and soft tissue around the joint was resected using a rongeur. This surgical site was flushed with sterile saline.    A linear incision was made to the lateral heel using a 15 blade. The incision was deepened via blunt dissection to the lateral calcaneal wall with care taken to avoid all important neurovascular structures including the sural nerve. Bone autograft was then obtained through the center of the lateral wall of the calcaneous using Arthrex OATS harvester. The deficit was then filled with 1 mL of arthrex bone matrix gel. Deep and superficial closure was performed with 4-0 vicryl and the skin was closed with 4-0 prolene using horizontal mattress sutures.       Next, the bone autograft obtained from the calcaneous was used to fill the deficit at the 1st MTP. A MTPJ locking plate by Arthrex was then applied to the MTPJ using proper AO  technique. The plate was noted to be placed in very good approximation to the bone and proper alignment of the 1st ray was noted visually as well as with C-arm. Proper AO techniques were used while performing all hardware application. Very good positioning was noted visually and confirmed on the c-arm and the 1st MTP was noted to be DF by 5-10 degrees and in rectus alignment.. The surgical site was then flushed with sterile saline.     Deep periosteal/capsular and subcutaneous tissue layer was reapproximated with 3-0 vicryl. Skin was reapproximated using 4-0 prolene horizontal mattress sutures. The skin was cleansed with wet and dry gauze. The tourniquet was deflated and a prompt hyperemic response was noted to all toes. The incisions were covered with xeroform and covered with sterile 4x4 gauze, cast padding, and lightly wrapped ACE wrap. The foot was secured in a short boot.     The patient tolerated the procedure and anesthesia well. She was transferred to the recovery room with vital signs stable, vascular status intact, and capillary refill time < 3 seconds to the distal right foot.              Estimated Blood Loss: < 5 cc            Specimens (From admission, onward)    Start     Ordered    08/15/18 0840  Specimen to Pathology - Surgery  Once     Start Status   08/15/18 0840 Collected (08/15/18 0842)       08/15/18 0842        Implants:   Implant Name Type Inv. Item Serial No.  Lot No. LRB No. Used   MATRIX BONE STIMUBLAST 1ML GEL - [id number]  MATRIX BONE STIMUBLAST 1ML GEL  ARTHREX [id number] Right 1   [id number]   [id number] ARTHREX  Right 1   GUIDEWIRE TROCAR TIP.062 - [id number]  GUIDEWIRE TROCAR TIP.062  ARTH[employer]X  Right 1   WIRE K SMALL BALL BB-KRYSTIAN - [id number]  WIRE K SMALL BALL BB-KRYSTIAN  ARTHREX  Right 2   PLATE MTP CONTOURED RIGHT - [id number]  PLATE MTP CONTOURED RIGHT  ARTHREX  Right 1   SCREW LP TITANIUM 3X20MM - [id number]  SCREW LP TITANIUM 3X20MM  ARTHREX  Right 1   SCREW LP VA 3X12MM NIKKI TI  - [id number]  SCREW LP VA 3X12MM NIKKI TI  ARTHREX  Right 2   SCREW LP VA 3X16MM NIKKI TI - [id number]  SCREW LP VA 3X16MM NIKKI TI  ARTHREX  Right 2              Disposition: PACU - hemodynamically stable.           Condition: Good    Attestation:  I was present and scrubbed for the entire procedure.           Discharge Note    Admit Date: 8/15/2018    Attending Physician: Ace Centeno DPM     Discharge Physician: Ace Centeno DPM    Final Diagnosis: Post-Op Diagnosis Codes:     * History of foot surgery [Z98.890]     * Arthrosis of foot, right [M19.071]     * Hallux rigidus, right foot [M20.21]    Disposition: Home or Self Care    Patient Instructions:   Current Discharge Medication List      START taking these medications    Details   ibuprofen (ADVIL,MOTRIN) 800 MG tablet Take 1 tablet (800 mg total) by mouth 3 (three) times daily.  Qty: 30 tablet, Refills: 1      oxyCODONE-acetaminophen (PERCOCET) 5-325 mg per tablet Take 2 tablets by mouth every 4 (four) hours as needed for Pain.  Qty: 60 tablet, Refills: 0         CONTINUE these medications which have NOT CHANGED    Details   ascorbic acid, vitamin C, (VITAMIN C DROPS) 60 mg Lozg Take by mouth.      estradiol (ESTRACE) 2 MG tablet       hydroCHLOROthiazide (MICROZIDE) 12.5 mg capsule       levothyroxine (SYNTHROID) 75 MCG tablet       traZODone (DESYREL) 50 MG tablet              Discharge Procedure Orders (must include Diet, Follow-up, Activity)   Discharge Procedure Orders (must include Diet, Follow-up, Activity)   Diet general     Keep surgical extremity elevated   Order Comments: Above heart level     Ice to affected area   Order Comments: Apply around right ankle region for 30 minute intervals throughout the day x 3-5 days.     Call MD for:  temperature >100.4     Call MD for:  persistent nausea and vomiting     Call MD for:  severe uncontrolled pain     Call MD for:  difficulty breathing, headache or visual disturbances     Leave dressing on -  Keep it clean, dry, and intact until clinic visit   Order Comments: May loosen outer ace wrap if too tight.     Weight bearing restrictions (specify)   Order Comments: Partial weightbearing right foot with orthopedic boot and crutches.        Discharge Date: No discharge date for patient encounter.     I have personally taken the history and examined this patient and agree with the resident's note as stated as above.   Ace Centeno DPM, FACFAS

## 2018-08-16 ENCOUNTER — TELEPHONE (OUTPATIENT)
Dept: PODIATRY | Facility: CLINIC | Age: 43
End: 2018-08-16

## 2018-08-16 VITALS
WEIGHT: 271.69 LBS | RESPIRATION RATE: 17 BRPM | HEIGHT: 67 IN | OXYGEN SATURATION: 96 % | DIASTOLIC BLOOD PRESSURE: 66 MMHG | SYSTOLIC BLOOD PRESSURE: 128 MMHG | BODY MASS INDEX: 42.64 KG/M2 | HEART RATE: 82 BPM | TEMPERATURE: 98 F

## 2018-08-16 NOTE — TELEPHONE ENCOUNTER
----- Message from Adina Christine sent at 8/16/2018 10:10 AM CDT -----  Contact: Self [phone]  Patient would like to speak with you about having blood come through the band and wants to know if she can change the bandage. Please advise

## 2018-08-20 LAB — BACTERIA SPEC AEROBE CULT: NO GROWTH

## 2018-08-22 LAB — BACTERIA SPEC ANAEROBE CULT: NORMAL

## 2018-08-23 ENCOUNTER — OFFICE VISIT (OUTPATIENT)
Dept: PODIATRY | Facility: CLINIC | Age: 43
End: 2018-08-23
Payer: COMMERCIAL

## 2018-08-23 VITALS
BODY MASS INDEX: 42.53 KG/M2 | SYSTOLIC BLOOD PRESSURE: 133 MMHG | DIASTOLIC BLOOD PRESSURE: 81 MMHG | HEART RATE: 69 BPM | HEIGHT: 67 IN | WEIGHT: 271 LBS

## 2018-08-23 DIAGNOSIS — Z98.890 POSTOPERATIVE STATE: Primary | ICD-10-CM

## 2018-08-23 PROCEDURE — 99024 POSTOP FOLLOW-UP VISIT: CPT | Mod: S$GLB,,, | Performed by: PODIATRIST

## 2018-08-23 PROCEDURE — 99999 PR PBB SHADOW E&M-EST. PATIENT-LVL III: CPT | Mod: PBBFAC,,, | Performed by: PODIATRIST

## 2018-08-23 NOTE — PROGRESS NOTES
"Subjective:      Patient ID: Isi Herrera is a 42 y.o. female.    Chief Complaint: Post-op Evaluation (1 wk dressing change)    History right foot surgery bunionectomy per Dr. Guzman with subsequent surgery per Dr. Johnson consisting of hardware removal with implantation of silastic toe spacer in great toe joint. Both physicians in [place]. C/o protruding implant right foot. C/o numbness along bottom of both feet mostly at night but also acts up during the day. History of bulging disk in low back with pain improving. Followed [facility] in [place], [place].    8/23/18: Presents s/p 1st MTP fusion with hardware removal/ bone graft application from [facility] on 8/15/18. Pt states only taking pain meds at night to help sleep. Pt states she is in her boot with crutches and keeping it dry.     Vitals:    08/23/18 0949   BP: 133/81   Pulse: 69   Weight: 122.9 kg (271 lb)   Height: 5' 7" (1.702 m)   PainSc: 0-No pain      Past Medical History:   Diagnosis Date    Thyroid disease     Hypothyroid       Past Surgical History:   Procedure Laterality Date    BUNIONECTOMY Right 2009    HEMORRHOID SURGERY      HYSTERECTOMY      full 2009       Family History   Problem Relation Age of Onset    Graves' disease Mother     Arthritis Father     Diabetes Sister        Social History     Socioeconomic History    Marital status:      Spouse name: None    Number of children: None    Years of education: None    Highest education level: None   Social Needs    Financial resource strain: None    Food insecurity - worry: None    Food insecurity - inability: None    Transportation needs - medical: None    Transportation needs - non-medical: None   Occupational History    None   Tobacco Use    Smoking status: Never Smoker   Substance and Sexual Activity    Alcohol use: No    Drug use: No    Sexual activity: Yes   Other Topics Concern    None   Social History Narrative    None       Current " Outpatient Medications   Medication Sig Dispense Refill    ascorbic acid, vitamin C, (VITAMIN C DROPS) 60 mg Lozg Take by mouth.      estradiol (ESTRACE) 2 MG tablet       hydroCHLOROthiazide (MICROZIDE) 12.5 mg capsule       ibuprofen (ADVIL,MOTRIN) 800 MG tablet Take 1 tablet (800 mg total) by mouth 3 (three) times daily. 30 tablet 1    levothyroxine (SYNTHROID) 75 MCG tablet       oxyCODONE-acetaminophen (PERCOCET) 5-325 mg per tablet Take 2 tablets by mouth every 4 (four) hours as needed for Pain. 60 tablet 0    traZODone (DESYREL) 50 MG tablet        No current facility-administered medications for this visit.        Review of patient's allergies indicates:  No Known Allergies      Review of Systems   Constitution: Negative for chills, fever, weakness and malaise/fatigue.   Cardiovascular: Negative for chest pain, claudication and leg swelling.   Respiratory: Negative for cough and shortness of breath.    Skin: Negative for color change, itching, poor wound healing and rash.   Musculoskeletal: Positive for back pain. Negative for joint pain, muscle cramps and muscle weakness.   Gastrointestinal: Negative for nausea and vomiting.   Neurological: Positive for numbness. Negative for paresthesias.   Psychiatric/Behavioral: Negative for altered mental status.           Objective:      Physical Exam   Constitutional: She is oriented to person, place, and time. She appears well-developed and well-nourished. No distress.   Cardiovascular:   Pulses:       Dorsalis pedis pulses are 2+ on the right side, and 2+ on the left side.        Posterior tibial pulses are 2+ on the right side, and 2+ on the left side.   CFT< 3 secs all toes bilateral foot, skin temp warm bilateral foot, diminished digital hair growth bilateral foot, no lower extremity edema bilateral.     Musculoskeletal:   Right hallux in rectus position with mild localized edema noted. Mild pain over donor site right lateral heel. No fluctuance or  crepitance right foot.   Neurological: She is alert and oriented to person, place, and time. She has normal strength. No sensory deficit.   Skin: Skin is warm, dry and intact. Capillary refill takes less than 2 seconds. No ecchymosis and no rash noted. She is not diaphoretic. No cyanosis or erythema. No pallor. Nails show no clubbing.   Skin well coapted and sutures intact to lateral calcaneal incision and to dorsal 1st MTP inscision, mild periwound erythema and edema distally to 1st MTP.               Assessment:       Encounter Diagnosis   Name Primary?    Postoperative state Yes         Plan:       Isi was seen today for post-op evaluation.    Diagnoses and all orders for this visit:    Postoperative state      I counseled the patient on her conditions, their implications and medical management.    Foot dressed with xeroform soaked in betadine, tyrell foam, cast padding, coban and secured in short boot  Continue crutches or knee roller. Home care instructions reviewed in detail. Permitted to shower. Pt NWB to sx extremity  Continue RICE therapy  RTC in 2 weeks for suture removal    Cristina Rutherford PGY3    I have personally taken the history and examined this patient and agree with the resident's note as stated as above.   Ace Centeno DPM, FACFAS    .

## 2018-09-06 ENCOUNTER — OFFICE VISIT (OUTPATIENT)
Dept: PODIATRY | Facility: CLINIC | Age: 43
End: 2018-09-06
Payer: COMMERCIAL

## 2018-09-06 VITALS
HEART RATE: 92 BPM | BODY MASS INDEX: 42.53 KG/M2 | HEIGHT: 67 IN | DIASTOLIC BLOOD PRESSURE: 84 MMHG | WEIGHT: 271 LBS | SYSTOLIC BLOOD PRESSURE: 139 MMHG

## 2018-09-06 DIAGNOSIS — Z98.890 POSTOPERATIVE STATE: Primary | ICD-10-CM

## 2018-09-06 PROCEDURE — 99999 PR PBB SHADOW E&M-EST. PATIENT-LVL III: CPT | Mod: PBBFAC,,, | Performed by: PODIATRIST

## 2018-09-06 PROCEDURE — 99024 POSTOP FOLLOW-UP VISIT: CPT | Mod: S$GLB,,, | Performed by: PODIATRIST

## 2018-09-06 NOTE — PATIENT INSTRUCTIONS
Transition to PWB right foot with orthopedic boot and crutches x 1 week then full weightbearing with boot

## 2018-09-08 NOTE — PROGRESS NOTES
"Subjective:      Patient ID: Isi Herrera is a 43 y.o. female.    Chief Complaint: Follow-up (right foot suture removal)    History right foot surgery bunionectomy per Dr. Guzman with subsequent surgery per Dr. Johnson consisting of hardware removal with implantation of silastic toe spacer in great toe joint. Both physicians in [place]. C/o protruding implant right foot. C/o numbness along bottom of both feet mostly at night but also acts up during the day. History of bulging disk in low back with pain improving. Followed [facility] in [place], [place].    8/23/18: Presents s/p 1st MTP fusion with hardware removal/ bone graft application from [facility] on 8/15/18. Pt states only taking pain meds at night to help sleep. Pt states she is in her boot with crutches and keeping it dry.     9/6/18: 3 weeks postop. Relates no pain. NWB right foot.    Vitals:    09/06/18 1334   BP: 139/84   Pulse: 92   Weight: 122.9 kg (271 lb)   Height: 5' 7" (1.702 m)   PainSc: 0-No pain      Past Medical History:   Diagnosis Date    Thyroid disease     Hypothyroid       Past Surgical History:   Procedure Laterality Date    BUNIONECTOMY Right 2009    FUSION OF METATARSOPHALANGEAL JOINT Right 8/15/2018    Procedure: FUSION, MTP JOINT;  Surgeon: Ace Centeno DPM;  Location: [facility] OR;  Service: Podiatry;  Laterality: Right;  mini c-arm, possible autograft bone harvest from calcaneus, Arthrex locking plate (Clair notified)    FUSION, MTP JOINT Right 8/15/2018    Performed by Ace Centeno DPM at [facility] OR    HEMORRHOID SURGERY      HYSTERECTOMY      full 2009       Family History   Problem Relation Age of Onset    Graves' disease Mother     Arthritis Father     Diabetes Sister        Social History     Socioeconomic History    Marital status:      Spouse name: Not on file    Number of children: Not on file    Years of education: Not on file    Highest education level: Not on file   Social Needs "    Financial resource strain: Not on file    Food insecurity - worry: Not on file    Food insecurity - inability: Not on file    Transportation needs - medical: Not on file    Transportation needs - non-medical: Not on file   Occupational History    Not on file   Tobacco Use    Smoking status: Never Smoker   Substance and Sexual Activity    Alcohol use: No    Drug use: No    Sexual activity: Yes   Other Topics Concern    Not on file   Social History Narrative    Not on file       Current Outpatient Medications   Medication Sig Dispense Refill    ascorbic acid, vitamin C, (VITAMIN C DROPS) 60 mg Lozg Take by mouth.      estradiol (ESTRACE) 2 MG tablet       hydroCHLOROthiazide (MICROZIDE) 12.5 mg capsule       ibuprofen (ADVIL,MOTRIN) 800 MG tablet Take 1 tablet (800 mg total) by mouth 3 (three) times daily. 30 tablet 1    levothyroxine (SYNTHROID) 75 MCG tablet       oxyCODONE-acetaminophen (PERCOCET) 5-325 mg per tablet Take 2 tablets by mouth every 4 (four) hours as needed for Pain. 60 tablet 0    traZODone (DESYREL) 50 MG tablet        No current facility-administered medications for this visit.        Review of patient's allergies indicates:  No Known Allergies      Review of Systems   Constitution: Negative for chills, fever, weakness and malaise/fatigue.   Cardiovascular: Negative for chest pain, claudication and leg swelling.   Respiratory: Negative for cough and shortness of breath.    Skin: Negative for color change, itching, poor wound healing and rash.   Musculoskeletal: Positive for back pain. Negative for joint pain, muscle cramps and muscle weakness.   Gastrointestinal: Negative for nausea and vomiting.   Neurological: Positive for numbness. Negative for paresthesias.   Psychiatric/Behavioral: Negative for altered mental status.           Objective:      Physical Exam   Constitutional: She is oriented to person, place, and time. She appears well-developed and well-nourished. No  distress.   Cardiovascular:   Pulses:       Dorsalis pedis pulses are 2+ on the right side, and 2+ on the left side.        Posterior tibial pulses are 2+ on the right side, and 2+ on the left side.   CFT< 3 secs all toes bilateral foot, skin temp warm bilateral foot, diminished digital hair growth bilateral foot, no lower extremity edema bilateral.     Musculoskeletal:   Right hallux in rectus position with mild localized edema noted. No pain over donor site right lateral heel. No fluctuance or crepitance right foot.   Neurological: She is alert and oriented to person, place, and time. She has normal strength. No sensory deficit.   Skin: Skin is warm, dry and intact. Capillary refill takes less than 2 seconds. No ecchymosis and no rash noted. She is not diaphoretic. No cyanosis or erythema. No pallor. Nails show no clubbing.   Skin well coapted and sutures intact to lateral calcaneal incision and to dorsal 1st MTP inscision, no erythema, mild edema.             Assessment:       Encounter Diagnosis   Name Primary?    Postoperative state Yes         Plan:       Isi was seen today for follow-up.    Diagnoses and all orders for this visit:    Postoperative state  -     X-Ray Foot Complete Right; Future      I counseled the patient on her conditions, their implications and medical management.    Sutures removed. Home care instructions reviewed in detail    Transition to PWB with orthopedic boot and walker x 1 week followed by full weightbearing with boot    RTC 4 weeks or prn.  .

## 2018-09-18 LAB — FUNGUS SPEC CULT: NORMAL

## 2018-10-05 ENCOUNTER — HOSPITAL ENCOUNTER (OUTPATIENT)
Dept: RADIOLOGY | Facility: HOSPITAL | Age: 43
Discharge: HOME | End: 2018-10-05
Attending: PODIATRIST
Payer: COMMERCIAL

## 2018-10-05 DIAGNOSIS — Z98.890 POSTOPERATIVE STATE: ICD-10-CM

## 2018-10-05 PROCEDURE — 73630 X-RAY EXAM OF FOOT: CPT | Mod: 26,RT,, | Performed by: RADIOLOGY

## 2018-10-05 PROCEDURE — 73630 X-RAY EXAM OF FOOT: CPT | Mod: TC,FY,RT

## 2018-10-08 ENCOUNTER — TELEPHONE (OUTPATIENT)
Dept: PODIATRY | Facility: CLINIC | Age: 43
End: 2018-10-08

## 2018-10-08 ENCOUNTER — OFFICE VISIT (OUTPATIENT)
Dept: PODIATRY | Facility: CLINIC | Age: 43
End: 2018-10-08
Payer: COMMERCIAL

## 2018-10-08 VITALS
HEART RATE: 71 BPM | DIASTOLIC BLOOD PRESSURE: 81 MMHG | HEIGHT: 67 IN | BODY MASS INDEX: 42.53 KG/M2 | SYSTOLIC BLOOD PRESSURE: 127 MMHG | WEIGHT: 271 LBS

## 2018-10-08 DIAGNOSIS — Z98.890 POSTOPERATIVE STATE: Primary | ICD-10-CM

## 2018-10-08 PROCEDURE — 99999 PR PBB SHADOW E&M-EST. PATIENT-LVL III: CPT | Mod: PBBFAC,,, | Performed by: PODIATRIST

## 2018-10-08 PROCEDURE — 99024 POSTOP FOLLOW-UP VISIT: CPT | Mod: S$GLB,,, | Performed by: PODIATRIST

## 2018-10-08 NOTE — TELEPHONE ENCOUNTER
----- Message from Ragini Lima sent at 10/8/2018 10:46 AM CDT -----  Contact: [phone]/self  Patient is running late for their appointment. She is at the [place] location.    Please advise.

## 2018-10-09 NOTE — PROGRESS NOTES
"Subjective:      Patient ID: Isi Herrera is a 43 y.o. female.    Chief Complaint: Follow-up (right foot )    History right foot surgery bunionectomy per Dr. Guzman with subsequent surgery per Dr. Johnson consisting of hardware removal with implantation of silastic toe spacer in great toe joint. Both physicians in [place]. C/o protruding implant right foot. C/o numbness along bottom of both feet mostly at night but also acts up during the day. History of bulging disk in low back with pain improving. Followed [facility] in [place], [place].    8/23/18: Presents s/p 1st MTP fusion with hardware removal/ bone graft application from [facility] on 8/15/18. Pt states only taking pain meds at night to help sleep. Pt states she is in her boot with crutches and keeping it dry.     9/6/18: 3 weeks postop. Relates no pain. NWB right foot.    10/8/18: 7 weeks postop. Relates no pain. WB with orthopedic boot.    Vitals:    10/08/18 1131   BP: 127/81   Pulse: 71   Weight: 122.9 kg (271 lb)   Height: 5' 7" (1.702 m)   PainSc: 0-No pain      Past Medical History:   Diagnosis Date    Thyroid disease     Hypothyroid       Past Surgical History:   Procedure Laterality Date    BUNIONECTOMY Right 2009    FUSION OF METATARSOPHALANGEAL JOINT Right 8/15/2018    Procedure: FUSION, MTP JOINT;  Surgeon: Ace Centeno DPM;  Location: [facility] OR;  Service: Podiatry;  Laterality: Right;  mini c-arm, possible autograft bone harvest from calcaneus, Arthrex locking plate (Clair notified)    FUSION, MTP JOINT Right 8/15/2018    Performed by Ace Centeno DPM at [facility] OR    HEMORRHOID SURGERY      HYSTERECTOMY      full 2009       Family History   Problem Relation Age of Onset    Graves' disease Mother     Arthritis Father     Diabetes Sister        Social History     Socioeconomic History    Marital status:      Spouse name: Not on file    Number of children: Not on file    Years of education: Not on file "    Highest education level: Not on file   Social Needs    Financial resource strain: Not on file    Food insecurity - worry: Not on file    Food insecurity - inability: Not on file    Transportation needs - medical: Not on file    Transportation needs - non-medical: Not on file   Occupational History    Not on file   Tobacco Use    Smoking status: Never Smoker   Substance and Sexual Activity    Alcohol use: No    Drug use: No    Sexual activity: Yes   Other Topics Concern    Not on file   Social History Narrative    Not on file       Current Outpatient Medications   Medication Sig Dispense Refill    ascorbic acid, vitamin C, (VITAMIN C DROPS) 60 mg Lozg Take by mouth.      estradiol (ESTRACE) 2 MG tablet       hydroCHLOROthiazide (MICROZIDE) 12.5 mg capsule       ibuprofen (ADVIL,MOTRIN) 800 MG tablet Take 1 tablet (800 mg total) by mouth 3 (three) times daily. 30 tablet 1    levothyroxine (SYNTHROID) 75 MCG tablet       oxyCODONE-acetaminophen (PERCOCET) 5-325 mg per tablet Take 2 tablets by mouth every 4 (four) hours as needed for Pain. 60 tablet 0    traZODone (DESYREL) 50 MG tablet        No current facility-administered medications for this visit.        Review of patient's allergies indicates:  No Known Allergies      Review of Systems   Constitution: Negative for chills, fever, weakness and malaise/fatigue.   Cardiovascular: Negative for chest pain, claudication and leg swelling.   Respiratory: Negative for cough and shortness of breath.    Skin: Negative for color change, itching, poor wound healing and rash.   Musculoskeletal: Positive for back pain. Negative for joint pain, muscle cramps and muscle weakness.   Gastrointestinal: Negative for nausea and vomiting.   Neurological: Positive for numbness. Negative for paresthesias.   Psychiatric/Behavioral: Negative for altered mental status.           Objective:      Physical Exam   Constitutional: She is oriented to person, place, and time.  She appears well-developed and well-nourished. No distress.   Cardiovascular:   Pulses:       Dorsalis pedis pulses are 2+ on the right side, and 2+ on the left side.        Posterior tibial pulses are 2+ on the right side, and 2+ on the left side.   CFT< 3 secs all toes bilateral foot, skin temp warm bilateral foot, diminished digital hair growth bilateral foot, no lower extremity edema bilateral.     Musculoskeletal:   Right hallux in rectus position with mild localized edema, no pain on palpation, no available 1 MTP motion. No pain over donor site right lateral heel. No fluctuance or crepitance right foot.   Neurological: She is alert and oriented to person, place, and time. She has normal strength. No sensory deficit.   Skin: Skin is warm, dry and intact. Capillary refill takes less than 2 seconds. No ecchymosis and no rash noted. She is not diaphoretic. No cyanosis or erythema. No pallor. Nails show no clubbing.   Normal appearing scars right foot.             Assessment:       Encounter Diagnosis   Name Primary?    Postoperative state Yes         Plan:       Isi was seen today for follow-up.    Diagnoses and all orders for this visit:    Postoperative state  -     X-Ray Foot Complete Right; Future      I counseled the patient on her conditions, their implications and medical management.    Reviewed right foot xray noting consolidating bone graft, no gapping, no hardware failure or loosening, maintained alignment.    Continue protected weightbearing with orthopedic boot.    May apply Darco shoe on foot to drive only.    Continue elevating and wearing compression stocking.    RTC 4 weeks or prn.  .

## 2018-10-17 LAB
ACID FAST MOD KINY STN SPEC: NORMAL
MYCOBACTERIUM SPEC QL CULT: NORMAL

## 2018-11-05 ENCOUNTER — HOSPITAL ENCOUNTER (OUTPATIENT)
Dept: RADIOLOGY | Facility: HOSPITAL | Age: 43
Discharge: HOME | End: 2018-11-05
Attending: PODIATRIST
Payer: COMMERCIAL

## 2018-11-05 ENCOUNTER — OFFICE VISIT (OUTPATIENT)
Dept: PODIATRY | Facility: CLINIC | Age: 43
End: 2018-11-05
Payer: COMMERCIAL

## 2018-11-05 VITALS
HEIGHT: 67 IN | HEART RATE: 69 BPM | SYSTOLIC BLOOD PRESSURE: 119 MMHG | BODY MASS INDEX: 42.53 KG/M2 | WEIGHT: 271 LBS | DIASTOLIC BLOOD PRESSURE: 79 MMHG

## 2018-11-05 DIAGNOSIS — Z98.890 POSTOPERATIVE STATE: ICD-10-CM

## 2018-11-05 DIAGNOSIS — Z98.890 POSTOPERATIVE STATE: Primary | ICD-10-CM

## 2018-11-05 PROCEDURE — 73630 X-RAY EXAM OF FOOT: CPT | Mod: TC,FY,RT

## 2018-11-05 PROCEDURE — 99999 PR PBB SHADOW E&M-EST. PATIENT-LVL III: CPT | Mod: PBBFAC,,, | Performed by: PODIATRIST

## 2018-11-05 PROCEDURE — 99024 POSTOP FOLLOW-UP VISIT: CPT | Mod: S$GLB,,, | Performed by: PODIATRIST

## 2018-11-05 PROCEDURE — 73630 X-RAY EXAM OF FOOT: CPT | Mod: 26,RT,, | Performed by: RADIOLOGY

## 2018-11-05 RX ORDER — LEVOTHYROXINE SODIUM 100 UG/1
TABLET ORAL
COMMUNITY
Start: 2018-10-04

## 2018-11-05 RX ORDER — TRAZODONE HYDROCHLORIDE 50 MG/1
TABLET ORAL
COMMUNITY
Start: 2017-04-14 | End: 2023-05-04

## 2018-11-05 RX ORDER — ESTRADIOL 2 MG/1
TABLET ORAL
COMMUNITY
Start: 2017-04-14 | End: 2022-11-21

## 2018-11-05 RX ORDER — HYDROCHLOROTHIAZIDE 12.5 MG/1
CAPSULE ORAL
COMMUNITY
Start: 2017-04-05 | End: 2023-05-04

## 2018-11-06 NOTE — PROGRESS NOTES
"Subjective:      Patient ID: Isi Herrera is a 43 y.o. female.    Chief Complaint: Follow-up    History right foot surgery bunionectomy per Dr. Guzman with subsequent surgery per Dr. Johnson consisting of hardware removal with implantation of silastic toe spacer in great toe joint. Both physicians in [place]. C/o protruding implant right foot. C/o numbness along bottom of both feet mostly at night but also acts up during the day. History of bulging disk in low back with pain improving. Followed [facility] in [place], [place].    8/23/18: Presents s/p 1st MTP fusion with hardware removal/ bone graft application from [facility] on 8/15/18. Pt states only taking pain meds at night to help sleep. Pt states she is in her boot with crutches and keeping it dry.     9/6/18: 3 weeks postop. Relates no pain. NWB right foot.    10/8/18: 7 weeks postop. Relates no pain. WB with orthopedic boot.    11/5/18: 12 weeks postop. Relates no pain. WB with orthopedic boot. No new complaints.    Vitals:    11/05/18 1116   BP: 119/79   Pulse: 69   Weight: 122.9 kg (271 lb)   Height: 5' 7" (1.702 m)   PainSc: 0-No pain      Past Medical History:   Diagnosis Date    Thyroid disease     Hypothyroid       Past Surgical History:   Procedure Laterality Date    BUNIONECTOMY Right 2009    FUSION OF METATARSOPHALANGEAL JOINT Right 8/15/2018    Procedure: FUSION, MTP JOINT;  Surgeon: Ace Centeno DPM;  Location: [facility] OR;  Service: Podiatry;  Laterality: Right;  mini c-arm, possible autograft bone harvest from calcaneus, Arthrex locking plate (Clair notified)    FUSION, MTP JOINT Right 8/15/2018    Performed by Ace Centeno DPM at [facility] OR    HEMORRHOID SURGERY      HYSTERECTOMY      full 2009       Family History   Problem Relation Age of Onset    Graves' disease Mother     Arthritis Father     Diabetes Sister        Social History     Socioeconomic History    Marital status:      Spouse name: None "    Number of children: None    Years of education: None    Highest education level: None   Social Needs    Financial resource strain: None    Food insecurity - worry: None    Food insecurity - inability: None    Transportation needs - medical: None    Transportation needs - non-medical: None   Occupational History    None   Tobacco Use    Smoking status: Never Smoker   Substance and Sexual Activity    Alcohol use: No    Drug use: No    Sexual activity: Yes   Other Topics Concern    None   Social History Narrative    None       Current Outpatient Medications   Medication Sig Dispense Refill    ascorbic acid, vitamin C, (VITAMIN C DROPS) 60 mg Lozg Take by mouth.      estradiol (ESTRACE) 2 MG tablet       estradiol (ESTRACE) 2 MG tablet       hydroCHLOROthiazide (MICROZIDE) 12.5 mg capsule       hydroCHLOROthiazide (MICROZIDE) 12.5 mg capsule       ibuprofen (ADVIL,MOTRIN) 800 MG tablet Take 1 tablet (800 mg total) by mouth 3 (three) times daily. 30 tablet 1    levothyroxine (SYNTHROID) 75 MCG tablet       oxyCODONE-acetaminophen (PERCOCET) 5-325 mg per tablet Take 2 tablets by mouth every 4 (four) hours as needed for Pain. 60 tablet 0    SYNTHROID 100 mcg tablet       traZODone (DESYREL) 50 MG tablet       traZODone (DESYREL) 50 MG tablet        No current facility-administered medications for this visit.        Review of patient's allergies indicates:  No Known Allergies      Review of Systems   Constitution: Negative for chills, fever, weakness and malaise/fatigue.   Cardiovascular: Negative for chest pain, claudication and leg swelling.   Respiratory: Negative for cough and shortness of breath.    Skin: Negative for color change, itching, poor wound healing and rash.   Musculoskeletal: Positive for back pain. Negative for joint pain, muscle cramps and muscle weakness.   Gastrointestinal: Negative for nausea and vomiting.   Neurological: Positive for numbness. Negative for paresthesias.    Psychiatric/Behavioral: Negative for altered mental status.           Objective:      Physical Exam   Constitutional: She is oriented to person, place, and time. She appears well-developed and well-nourished. No distress.   Cardiovascular:   Pulses:       Dorsalis pedis pulses are 2+ on the right side, and 2+ on the left side.        Posterior tibial pulses are 2+ on the right side, and 2+ on the left side.   CFT< 3 secs all toes bilateral foot, skin temp warm bilateral foot, diminished digital hair growth bilateral foot, no lower extremity edema bilateral.     Musculoskeletal:   Right hallux in rectus position with mild localized edema, no pain on palpation, no available 1 MTP motion. No pain over donor site right lateral heel. No fluctuance or crepitance right foot.   Neurological: She is alert and oriented to person, place, and time. She has normal strength. No sensory deficit.   Skin: Skin is warm, dry and intact. Capillary refill takes less than 2 seconds. No ecchymosis and no rash noted. She is not diaphoretic. No cyanosis or erythema. No pallor. Nails show no clubbing.   Normal appearing scars right foot.             Assessment:       Encounter Diagnosis   Name Primary?    Postoperative state Yes         Plan:       Isi was seen today for follow-up.    Diagnoses and all orders for this visit:    Postoperative state  -     X-Ray Foot Complete Right; Future      I counseled the patient on her conditions, their implications and medical management.    Reviewed right foot xray noting consolidatied bone graft, no gapping, no hardware failure or loosening, maintained alignment.    Transition from orthopedic boot to tennis shoe with gradual 1 hour daily increments as discussed. Patient may gradually increase activity as discussed    RTC 2 months or prn.

## 2019-01-03 ENCOUNTER — HOSPITAL ENCOUNTER (OUTPATIENT)
Dept: RADIOLOGY | Facility: HOSPITAL | Age: 44
Discharge: HOME | End: 2019-01-03
Attending: PODIATRIST
Payer: COMMERCIAL

## 2019-01-03 DIAGNOSIS — Z98.890 POSTOPERATIVE STATE: ICD-10-CM

## 2019-01-03 PROCEDURE — 73630 XR FOOT COMPLETE 3 VIEW RIGHT: ICD-10-PCS | Mod: 26,RT,, | Performed by: RADIOLOGY

## 2019-01-03 PROCEDURE — 73630 X-RAY EXAM OF FOOT: CPT | Mod: 26,RT,, | Performed by: RADIOLOGY

## 2019-01-03 PROCEDURE — 73630 X-RAY EXAM OF FOOT: CPT | Mod: TC,PN,RT

## 2019-01-17 ENCOUNTER — OFFICE VISIT (OUTPATIENT)
Dept: PODIATRY | Facility: CLINIC | Age: 44
End: 2019-01-17
Payer: COMMERCIAL

## 2019-01-17 VITALS
SYSTOLIC BLOOD PRESSURE: 120 MMHG | BODY MASS INDEX: 42.53 KG/M2 | HEIGHT: 67 IN | WEIGHT: 271 LBS | DIASTOLIC BLOOD PRESSURE: 75 MMHG | HEART RATE: 83 BPM

## 2019-01-17 DIAGNOSIS — R60.0 EDEMA OF RIGHT FOOT: ICD-10-CM

## 2019-01-17 DIAGNOSIS — Z98.890 HISTORY OF FOOT SURGERY: Primary | ICD-10-CM

## 2019-01-17 PROCEDURE — 99213 OFFICE O/P EST LOW 20 MIN: CPT | Mod: S$GLB,,, | Performed by: PODIATRIST

## 2019-01-17 PROCEDURE — 99213 PR OFFICE/OUTPT VISIT, EST, LEVL III, 20-29 MIN: ICD-10-PCS | Mod: S$GLB,,, | Performed by: PODIATRIST

## 2019-01-17 PROCEDURE — 3008F BODY MASS INDEX DOCD: CPT | Mod: CPTII,S$GLB,, | Performed by: PODIATRIST

## 2019-01-17 PROCEDURE — 99999 PR PBB SHADOW E&M-EST. PATIENT-LVL III: CPT | Mod: PBBFAC,,, | Performed by: PODIATRIST

## 2019-01-17 PROCEDURE — 99999 PR PBB SHADOW E&M-EST. PATIENT-LVL III: ICD-10-PCS | Mod: PBBFAC,,, | Performed by: PODIATRIST

## 2019-01-17 PROCEDURE — 3008F PR BODY MASS INDEX (BMI) DOCUMENTED: ICD-10-PCS | Mod: CPTII,S$GLB,, | Performed by: PODIATRIST

## 2019-01-18 NOTE — PROGRESS NOTES
"Subjective:      Patient ID: Isi Herrera is a 43 y.o. female.    Chief Complaint: Follow-up (right foot )    History right foot surgery bunionectomy per Dr. Guzman with subsequent surgery per Dr. Johnson consisting of hardware removal with implantation of silastic toe spacer in great toe joint. Both physicians in [place]. C/o protruding implant right foot. C/o numbness along bottom of both feet mostly at night but also acts up during the day. History of bulging disk in low back with pain improving. Followed [facility] in [place], [place].    8/23/18: Presents s/p 1st MTP fusion with hardware removal/ bone graft application from [facility] on 8/15/18. Pt states only taking pain meds at night to help sleep. Pt states she is in her boot with crutches and keeping it dry.     9/6/18: 3 weeks postop. Relates no pain. NWB right foot.    10/8/18: 7 weeks postop. Relates no pain. WB with orthopedic boot.    11/5/18: 12 weeks postop. Relates no pain. WB with orthopedic boot. No new complaints    1/7/19 f/u 1st mTPJ fusion.  Patient fully wb with no problems.  Denies pain, no new complaints    Vitals:    01/17/19 1351   BP: 120/75   Pulse: 83   Weight: 122.9 kg (271 lb)   Height: 5' 7" (1.702 m)      Past Medical History:   Diagnosis Date    Thyroid disease     Hypothyroid       Past Surgical History:   Procedure Laterality Date    BUNIONECTOMY Right 2009    FUSION, MTP JOINT Right 8/15/2018    Performed by [facility]    HEMORRHOID SURGERY      HYSTERECTOMY      full 2009       Family History   Problem Relation Age of Onset    Graves' disease Mother     Arthritis Father     Diabetes Sister        Social History     Socioeconomic History    Marital status:      Spouse name: Not on file    Number of children: Not on file    Years of education: Not on file    Highest education level: Not on file   Social Needs    Financial resource strain: Not on file    Food " insecurity - worry: Not on file    Food insecurity - inability: Not on file    Transportation needs - medical: Not on file    Transportation needs - non-medical: Not on file   Occupational History    Not on file   Tobacco Use    Smoking status: Never Smoker   Substance and Sexual Activity    Alcohol use: No    Drug use: No    Sexual activity: Yes   Other Topics Concern    Not on file   Social History Narrative    Not on file       Current Outpatient Medications   Medication Sig Dispense Refill    ascorbic acid, vitamin C, (VITAMIN C DROPS) 60 mg Lozg Take by mouth.      estradiol (ESTRACE) 2 MG tablet       estradiol (ESTRACE) 2 MG tablet       hydroCHLOROthiazide (MICROZIDE) 12.5 mg capsule       hydroCHLOROthiazide (MICROZIDE) 12.5 mg capsule       levothyroxine (SYNTHROID) 75 MCG tablet       SYNTHROID 100 mcg tablet       traZODone (DESYREL) 50 MG tablet       traZODone (DESYREL) 50 MG tablet       ibuprofen (ADVIL,MOTRIN) 800 MG tablet Take 1 tablet (800 mg total) by mouth 3 (three) times daily. 30 tablet 1    oxyCODONE-acetaminophen (PERCOCET) 5-325 mg per tablet Take 2 tablets by mouth every 4 (four) hours as needed for Pain. 60 tablet 0     No current facility-administered medications for this visit.        Review of patient's allergies indicates:  No Known Allergies      Review of Systems   Constitution: Negative for chills, fever, weakness and malaise/fatigue.   Cardiovascular: Negative for chest pain, claudication and leg swelling.   Respiratory: Negative for cough and shortness of breath.    Skin: Negative for color change, itching, poor wound healing and rash.   Musculoskeletal: Positive for back pain. Negative for joint pain, muscle cramps and muscle weakness.   Gastrointestinal: Negative for nausea and vomiting.   Neurological: Positive for numbness. Negative for paresthesias.   Psychiatric/Behavioral: Negative for altered mental status.           Objective:      Physical Exam    Constitutional: She is oriented to person, place, and time. She appears well-developed and well-nourished. No distress.   Cardiovascular:   Pulses:       Dorsalis pedis pulses are 2+ on the right side, and 2+ on the left side.        Posterior tibial pulses are 2+ on the right side, and 2+ on the left side.   CFT< 3 secs all toes bilateral foot, skin temp warm bilateral foot, diminished digital hair growth bilateral foot, no lower extremity edema bilateral.     Musculoskeletal:   Right hallux in rectus position with slight localized edema, no pain on palpation, no available 1 MTP motion. No pain over donor site right lateral heel. No fluctuance or crepitance right foot.   Neurological: She is alert and oriented to person, place, and time. She has normal strength. No sensory deficit.   Skin: Skin is warm, dry and intact. Capillary refill takes less than 2 seconds. No ecchymosis and no rash noted. She is not diaphoretic. No cyanosis or erythema. No pallor. Nails show no clubbing.   Normal appearing scars right foot.             Assessment:       Encounter Diagnoses   Name Primary?    History of foot surgery Yes    Edema of right foot          Plan:       Isi was seen today for follow-up.    Diagnoses and all orders for this visit:    History of foot surgery  -     X-Ray Foot Complete Right; Future    Edema of right foot      I counseled the patient on her conditions, their implications and medical management.    Discussed results of x-ray with patient.  Toe in rectus alignment with signs of healing.    Continue WBAT in regular shoes    RTC 6 months with repeat x-rays    Assisted by Adrian Teran, PGY2    I have personally taken the history and examined this patient and agree with the resident's note as stated as above.   Ace HENSONM, FACFAS    X-ray revealed stable fixation with no failure or loosening and consolidation of the bone graft with maintained alignment of the hallux.

## 2019-11-14 DIAGNOSIS — Z12.31 ENCOUNTER FOR SCREENING MAMMOGRAM FOR BREAST CANCER: Primary | ICD-10-CM

## 2019-12-06 ENCOUNTER — HOSPITAL ENCOUNTER (OUTPATIENT)
Dept: RADIOLOGY | Facility: HOSPITAL | Age: 44
Discharge: HOME | End: 2019-12-06
Attending: OBSTETRICS & GYNECOLOGY
Payer: COMMERCIAL

## 2019-12-06 DIAGNOSIS — Z12.31 ENCOUNTER FOR SCREENING MAMMOGRAM FOR BREAST CANCER: ICD-10-CM

## 2019-12-06 PROCEDURE — 77067 SCR MAMMO BI INCL CAD: CPT | Mod: TC

## 2019-12-06 PROCEDURE — 77067 MAMMO DIGITAL SCREENING BILAT WITH TOMOSYNTHESIS_CAD: ICD-10-PCS | Mod: 26,,, | Performed by: RADIOLOGY

## 2019-12-06 PROCEDURE — 77063 MAMMO DIGITAL SCREENING BILAT WITH TOMOSYNTHESIS_CAD: ICD-10-PCS | Mod: 26,,, | Performed by: RADIOLOGY

## 2019-12-06 PROCEDURE — 77063 BREAST TOMOSYNTHESIS BI: CPT | Mod: 26,,, | Performed by: RADIOLOGY

## 2019-12-06 PROCEDURE — 77067 SCR MAMMO BI INCL CAD: CPT | Mod: 26,,, | Performed by: RADIOLOGY

## 2020-01-07 ENCOUNTER — TELEPHONE (OUTPATIENT)
Dept: PODIATRY | Facility: CLINIC | Age: 45
End: 2020-01-07

## 2020-01-07 NOTE — TELEPHONE ENCOUNTER
----- Message from Taco Cruz sent at 1/7/2020 10:18 AM CST -----  Contact: self/[phone]  Type:  Needs Medical Advice    Who Called: patient  Reason: Do she need to schedule xray prior to her appt on 2/3/20  Would the patient rather a call back or a response via [facility]? call  Best Call Back Number: [phone]  Additional Information: none

## 2020-01-07 NOTE — TELEPHONE ENCOUNTER
Left message for pt letting her know that there is not an order in and I can tell Dr. Centeno to put an order in if she like

## 2020-01-30 NOTE — TRANSFER OF CARE
"Anesthesia Transfer of Care Note    Patient: Isi Herrera    Procedure(s) Performed: Procedure(s) (LRB):  FUSION, MTP JOINT (Right)    Patient location: PACU    Anesthesia Type: general    Transport from OR: Transported from OR on 2-3 L/min O2 by NC with adequate spontaneous ventilation    Post pain: adequate analgesia    Post assessment: no apparent anesthetic complications and tolerated procedure well    Post vital signs: stable    Level of consciousness: responds to stimulation    Nausea/Vomiting: no nausea/vomiting    Complications: none    Transfer of care protocol was followed      Last vitals:   Visit Vitals  BP (!) 140/71 (BP Location: Right arm, Patient Position: Lying)   Pulse 76   Temp 36.8 °C (98.2 °F) (Skin)   Resp 18   Ht 5' 7" (1.702 m)   Wt 123.2 kg (271 lb 11.5 oz)   LMP  (LMP Unknown)   SpO2 95%   Breastfeeding? No   BMI 42.56 kg/m²     "
no

## 2020-01-31 ENCOUNTER — OFFICE VISIT (OUTPATIENT)
Dept: PODIATRY | Facility: CLINIC | Age: 45
End: 2020-01-31
Payer: COMMERCIAL

## 2020-01-31 ENCOUNTER — HOSPITAL ENCOUNTER (OUTPATIENT)
Dept: RADIOLOGY | Facility: HOSPITAL | Age: 45
Discharge: HOME | End: 2020-01-31
Attending: PODIATRIST
Payer: COMMERCIAL

## 2020-01-31 VITALS
BODY MASS INDEX: 42.53 KG/M2 | HEIGHT: 67 IN | WEIGHT: 271 LBS | HEART RATE: 76 BPM | DIASTOLIC BLOOD PRESSURE: 81 MMHG | SYSTOLIC BLOOD PRESSURE: 138 MMHG

## 2020-01-31 DIAGNOSIS — M62.469 GASTROCNEMIUS EQUINUS, UNSPECIFIED LATERALITY: ICD-10-CM

## 2020-01-31 DIAGNOSIS — Z87.39 HISTORY OF BACK PAIN: ICD-10-CM

## 2020-01-31 DIAGNOSIS — M20.5X2 HALLUX LIMITUS, LEFT: Primary | ICD-10-CM

## 2020-01-31 DIAGNOSIS — M20.5X2 HALLUX LIMITUS, LEFT: ICD-10-CM

## 2020-01-31 PROCEDURE — 99999 PR PBB SHADOW E&M-EST. PATIENT-LVL IV: ICD-10-PCS | Mod: PBBFAC,,, | Performed by: PODIATRIST

## 2020-01-31 PROCEDURE — 99213 PR OFFICE/OUTPT VISIT, EST, LEVL III, 20-29 MIN: ICD-10-PCS | Mod: S$GLB,,, | Performed by: PODIATRIST

## 2020-01-31 PROCEDURE — 99213 OFFICE O/P EST LOW 20 MIN: CPT | Mod: S$GLB,,, | Performed by: PODIATRIST

## 2020-01-31 PROCEDURE — 3008F PR BODY MASS INDEX (BMI) DOCUMENTED: ICD-10-PCS | Mod: CPTII,S$GLB,, | Performed by: PODIATRIST

## 2020-01-31 PROCEDURE — 99999 PR PBB SHADOW E&M-EST. PATIENT-LVL IV: CPT | Mod: PBBFAC,,, | Performed by: PODIATRIST

## 2020-01-31 PROCEDURE — 3008F BODY MASS INDEX DOCD: CPT | Mod: CPTII,S$GLB,, | Performed by: PODIATRIST

## 2020-01-31 PROCEDURE — 73630 X-RAY EXAM OF FOOT: CPT | Mod: 26,LT,, | Performed by: RADIOLOGY

## 2020-01-31 PROCEDURE — 73630 XR FOOT COMPLETE 3 VIEW LEFT: ICD-10-PCS | Mod: 26,LT,, | Performed by: RADIOLOGY

## 2020-01-31 PROCEDURE — 73630 X-RAY EXAM OF FOOT: CPT | Mod: TC,PN,LT

## 2020-01-31 RX ORDER — GABAPENTIN 300 MG/1
CAPSULE ORAL
COMMUNITY
Start: 2019-11-14 | End: 2022-11-21

## 2020-01-31 NOTE — PROGRESS NOTES
"Subjective:      Patient ID: Isi Herrera is a 44 y.o. female.    Chief Complaint: Bunions (Left ) and Follow-up    History right foot surgery bunionectomy per Dr. Guzman with subsequent surgery per Dr. Johnson consisting of hardware removal with implantation of silastic toe spacer in great toe joint. Both physicians in [place]. C/o protruding implant right foot. C/o numbness along bottom of both feet mostly at night but also acts up during the day. History of bulging disk in low back with pain improving. Followed [facility] in [place], [place].    8/23/18: Presents s/p 1st MTP fusion with hardware removal/ bone graft application from [facility] on 8/15/18. Pt states only taking pain meds at night to help sleep. Pt states she is in her boot with crutches and keeping it dry.     9/6/18: 3 weeks postop. Relates no pain. NWB right foot.    10/8/18: 7 weeks postop. Relates no pain. WB with orthopedic boot.    11/5/18: 12 weeks postop. Relates no pain. WB with orthopedic boot. No new complaints    1/7/19 f/u 1st mTPJ fusion.  Patient fully wb with no problems.  Denies pain, no new complaints    1/31/2020 patient presents with pain on her left great toe.  Complains of a bunion.  States that her pain is mostly at night when she is in bed specially when her legs are swollen.  Describes pain as a tightness.  Also complains of neuropathy, and says she has a bad back.  Denies trauma or falls.    Vitals:    01/31/20 1424   BP: 138/81   Pulse: 76   Weight: 122.9 kg (271 lb)   Height: 5' 7" (1.702 m)   PainSc: 0-No pain      Past Medical History:   Diagnosis Date    Thyroid disease     Hypothyroid       Past Surgical History:   Procedure Laterality Date    BUNIONECTOMY Right 2009    FUSION OF METATARSOPHALANGEAL JOINT Right 8/15/2018    Procedure: FUSION, MTP JOINT;  Surgeon: Ace Centeno DPM;  Location: [facility];  Service: Podiatry;  Laterality: Right;  mini c-arm, possible autograft bone harvest from " calcaneus, Arthrex locking plate (Clair notified)    HEMORRHOID SURGERY      HYSTERECTOMY      full 2009    OOPHORECTOMY         Family History   Problem Relation Age of Onset    Graves' disease Mother     Arthritis Father     Prostate cancer Father     Diabetes Sister        Social History     Socioeconomic History    Marital status:      Spouse name: Not on file    Number of children: Not on file    Years of education: Not on file    Highest education level: Not on file   Occupational History    Not on file   Social Needs    Financial resource strain: Not on file    Food insecurity:     Worry: Not on file     Inability: Not on file    Transportation needs:     Medical: Not on file     Non-medical: Not on file   Tobacco Use    Smoking status: Never Smoker   Substance and Sexual Activity    Alcohol use: No    Drug use: No    Sexual activity: Yes   Lifestyle    Physical activity:     Days per week: Not on file     Minutes per session: Not on file    Stress: Not on file   Relationships    Social connections:     Talks on phone: Not on file     Gets together: Not on file     Attends Synagogue service: Not on file     Active member of club or organization: Not on file     Attends meetings of clubs or organizations: Not on file     Relationship status: Not on file   Other Topics Concern    Not on file   Social History Narrative    Not on file       Current Outpatient Medications   Medication Sig Dispense Refill    ascorbic acid, vitamin C, (VITAMIN C DROPS) 60 mg Lozg Take by mouth.      estradiol (ESTRACE) 2 MG tablet       gabapentin (NEURONTIN) 300 MG capsule       hydroCHLOROthiazide (MICROZIDE) 12.5 mg capsule       ibuprofen (ADVIL,MOTRIN) 800 MG tablet Take 1 tablet (800 mg total) by mouth 3 (three) times daily. 30 tablet 1    levothyroxine (SYNTHROID) 75 MCG tablet       SYNTHROID 100 mcg tablet       traZODone (DESYREL) 50 MG tablet        No current facility-administered  medications for this visit.        Review of patient's allergies indicates:  No Known Allergies      Review of Systems   Constitution: Negative for chills, fever and malaise/fatigue.   Cardiovascular: Negative for chest pain, claudication and leg swelling.   Respiratory: Negative for cough and shortness of breath.    Skin: Negative for color change, itching, poor wound healing and rash.   Musculoskeletal: Positive for back pain. Negative for joint pain, muscle cramps and muscle weakness.   Gastrointestinal: Negative for nausea and vomiting.   Neurological: Positive for numbness. Negative for paresthesias and weakness.   Psychiatric/Behavioral: Negative for altered mental status.           Objective:      Physical Exam   Constitutional: She is oriented to person, place, and time. She appears well-developed and well-nourished. No distress.   Cardiovascular:   Pulses:       Dorsalis pedis pulses are 2+ on the right side, and 2+ on the left side.        Posterior tibial pulses are 2+ on the right side, and 2+ on the left side.   CFT< 3 secs all toes bilateral foot, skin temp warm bilateral foot, diminished digital hair growth bilateral foot, no lower extremity edema bilateral.     Musculoskeletal:   B/l 1st MTPJ with Decreased ROM with weight bearing    Dorsal medial prominence to left 1st metatarsal.       Ankle dorsiflexion decreased at <10 degrees bilateral with moderate increase with knee flexion bilateral.      Right hallux in rectus position without pain on palpation, no available 1 MTP motion.    Neurological: She is alert and oriented to person, place, and time. She has normal strength. No sensory deficit.   Positive Tinel sign without provocation sign over the deep peroneal and tibial nerves bilateral.   Skin: Skin is warm, dry and intact. Capillary refill takes less than 2 seconds. No ecchymosis and no rash noted. She is not diaphoretic. No cyanosis or erythema. No pallor. Nails show no clubbing.   Normal  appearing scars right foot.                 Assessment:       Encounter Diagnoses   Name Primary?    Hallux limitus, left Yes    History of back pain     Gastrocnemius equinus, unspecified laterality          Plan:       Isi was seen today for bunions and follow-up.    Diagnoses and all orders for this visit:    Hallux limitus, left  -     X-Ray Foot Complete Left; Future    History of back pain    Gastrocnemius equinus, unspecified laterality      I counseled the patient on her conditions, their implications and medical management.    Ordered x-ray to assess underlying deformity left foot and for possible surgical planning.    Recommended patient see neurologist for her back pain    Discussed importance of wearing supportive shoes that are deep and wide enough    -rtc as specified    Assisted by Adrian Teran, PGY3    I have personally taken the history and examined this patient and agree with the resident's note as stated as above.   Ace Centeno DPM, FACFAS    A portion of this note was generated by voice recognition software and may contain topical graphical errors.

## 2020-02-02 PROBLEM — M20.5X2 HALLUX LIMITUS, LEFT: Status: ACTIVE | Noted: 2020-02-02

## 2020-02-02 PROBLEM — M19.071: Status: RESOLVED | Noted: 2018-08-15 | Resolved: 2020-02-02

## 2020-02-02 PROBLEM — M20.21 HALLUX RIGIDUS, RIGHT FOOT: Status: RESOLVED | Noted: 2018-08-15 | Resolved: 2020-02-02

## 2020-03-02 ENCOUNTER — OFFICE VISIT (OUTPATIENT)
Dept: PODIATRY | Facility: CLINIC | Age: 45
End: 2020-03-02
Payer: COMMERCIAL

## 2020-03-02 VITALS
HEIGHT: 67 IN | DIASTOLIC BLOOD PRESSURE: 74 MMHG | WEIGHT: 271 LBS | BODY MASS INDEX: 42.53 KG/M2 | SYSTOLIC BLOOD PRESSURE: 124 MMHG | HEART RATE: 68 BPM

## 2020-03-02 DIAGNOSIS — M20.12 HALLUX VALGUS, LEFT: ICD-10-CM

## 2020-03-02 DIAGNOSIS — M20.5X2 HALLUX LIMITUS, LEFT: Primary | ICD-10-CM

## 2020-03-02 DIAGNOSIS — Z01.818 PREOP TESTING: ICD-10-CM

## 2020-03-02 PROCEDURE — 99214 PR OFFICE/OUTPT VISIT, EST, LEVL IV, 30-39 MIN: ICD-10-PCS | Mod: S$GLB,,, | Performed by: PODIATRIST

## 2020-03-02 PROCEDURE — 99214 OFFICE O/P EST MOD 30 MIN: CPT | Mod: S$GLB,,, | Performed by: PODIATRIST

## 2020-03-02 PROCEDURE — 3008F PR BODY MASS INDEX (BMI) DOCUMENTED: ICD-10-PCS | Mod: CPTII,S$GLB,, | Performed by: PODIATRIST

## 2020-03-02 PROCEDURE — 99999 PR PBB SHADOW E&M-EST. PATIENT-LVL V: ICD-10-PCS | Mod: PBBFAC,,, | Performed by: PODIATRIST

## 2020-03-02 PROCEDURE — 99999 PR PBB SHADOW E&M-EST. PATIENT-LVL V: CPT | Mod: PBBFAC,,, | Performed by: PODIATRIST

## 2020-03-02 PROCEDURE — 3008F BODY MASS INDEX DOCD: CPT | Mod: CPTII,S$GLB,, | Performed by: PODIATRIST

## 2020-03-02 RX ORDER — SODIUM CHLORIDE 0.9 % (FLUSH) 0.9 %
10 SYRINGE (ML) INJECTION
Status: SHIPPED | OUTPATIENT
Start: 2020-03-02

## 2020-03-02 RX ORDER — LIDOCAINE HYDROCHLORIDE 10 MG/ML
1 INJECTION, SOLUTION EPIDURAL; INFILTRATION; INTRACAUDAL; PERINEURAL ONCE
Status: CANCELLED | OUTPATIENT
Start: 2020-03-02 | End: 2020-03-02

## 2020-03-02 NOTE — PATIENT INSTRUCTIONS
Schedule for 1 TMT (fusion of the first metatarsal cuneiform joint to re-align the first metatarsal in a down varma direction to help the alignment at the big toe joint) arthrodesis with cheilectomy (removing the bone spurs around the big toe joint and inspecting the joint) 1 MTP left foot using ex-fix (external fixation device with pins sticking out of the skin attached to a metatillic bar that comes off at 6-8 weeks after surgery) on 3/25/2020

## 2020-03-03 NOTE — PROGRESS NOTES
"Subjective:      Patient ID: Isi Herrera is a 44 y.o. female.    Chief Complaint: Follow-up (left foot )    History right foot surgery bunionectomy per Dr. Guzman with subsequent surgery per Dr. Johnson consisting of hardware removal with implantation of silastic toe spacer in great toe joint. Both physicians in [place]. C/o protruding implant right foot. C/o numbness along bottom of both feet mostly at night but also acts up during the day. History of bulging disk in low back with pain improving. Followed [facility] in [place], [place].    8/23/18: Presents s/p 1st MTP fusion with hardware removal/ bone graft application from [facility] on 8/15/18. Pt states only taking pain meds at night to help sleep. Pt states she is in her boot with crutches and keeping it dry.     9/6/18: 3 weeks postop. Relates no pain. NWB right foot.    10/8/18: 7 weeks postop. Relates no pain. WB with orthopedic boot.    11/5/18: 12 weeks postop. Relates no pain. WB with orthopedic boot. No new complaints    1/7/19 f/u 1st mTPJ fusion.  Patient fully wb with no problems.  Denies pain, no new complaints    1/31/2020 patient presents with pain on her left great toe.  Complains of a bunion.  States that her pain is mostly at night when she is in bed specially when her legs are swollen.  Describes pain as a tightness.  Also complains of neuropathy, and says she has a bad back.  Denies trauma or falls.    3/2/2020: Returns to review her foot xray and discuss possible surgical intervention. Relates her pain is unchanged compared to last visit. No new complaints.    Vitals:    03/02/20 1133   BP: 124/74   Pulse: 68   Weight: 122.9 kg (271 lb)   Height: 5' 7" (1.702 m)   PainSc:   3      Past Medical History:   Diagnosis Date    Thyroid disease     Hypothyroid       Past Surgical History:   Procedure Laterality Date    BUNIONECTOMY Right 2009    FUSION OF METATARSOPHALANGEAL JOINT Right 8/15/2018    Procedure: FUSION, MTP " JOINT;  Surgeon: Ace Centeno DPM;  Location: [facility] OR;  Service: Podiatry;  Laterality: Right;  mini c-arm, possible autograft bone harvest from calcaneus, Arthrex locking plate (Clair notified)    HEMORRHOID SURGERY      HYSTERECTOMY      full 2009    OOPHORECTOMY         Family History   Problem Relation Age of Onset    Graves' disease Mother     Arthritis Father     Prostate cancer Father     Diabetes Sister        Social History     Socioeconomic History    Marital status:      Spouse name: Not on file    Number of children: Not on file    Years of education: Not on file    Highest education level: Not on file   Occupational History    Not on file   Social Needs    Financial resource strain: Not on file    Food insecurity:     Worry: Not on file     Inability: Not on file    Transportation needs:     Medical: Not on file     Non-medical: Not on file   Tobacco Use    Smoking status: Never Smoker   Substance and Sexual Activity    Alcohol use: No    Drug use: No    Sexual activity: Yes   Lifestyle    Physical activity:     Days per week: Not on file     Minutes per session: Not on file    Stress: Not on file   Relationships    Social connections:     Talks on phone: Not on file     Gets together: Not on file     Attends Baptism service: Not on file     Active member of club or organization: Not on file     Attends meetings of clubs or organizations: Not on file     Relationship status: Not on file   Other Topics Concern    Not on file   Social History Narrative    Not on file       Current Outpatient Medications   Medication Sig Dispense Refill    ascorbic acid, vitamin C, (VITAMIN C DROPS) 60 mg Lozg Take by mouth.      estradiol (ESTRACE) 2 MG tablet       hydroCHLOROthiazide (MICROZIDE) 12.5 mg capsule       ibuprofen (ADVIL,MOTRIN) 800 MG tablet Take 1 tablet (800 mg total) by mouth 3 (three) times daily. 30 tablet 1    levothyroxine (SYNTHROID) 75 MCG tablet        SYNTHROID 100 mcg tablet       traZODone (DESYREL) 50 MG tablet       gabapentin (NEURONTIN) 300 MG capsule        Current Facility-Administered Medications   Medication Dose Route Frequency Provider Last Rate Last Dose    sodium chloride 0.9% flush 10 mL  10 mL Intravenous PRN Ace Centeno DPM           Review of patient's allergies indicates:  No Known Allergies      Review of Systems   Constitution: Negative for chills, fever and malaise/fatigue.   Cardiovascular: Negative for chest pain, claudication and leg swelling.   Respiratory: Negative for cough and shortness of breath.    Skin: Negative for color change, itching, poor wound healing and rash.   Musculoskeletal: Positive for back pain. Negative for joint pain, muscle cramps and muscle weakness.   Gastrointestinal: Negative for nausea and vomiting.   Neurological: Positive for numbness. Negative for paresthesias and weakness.   Psychiatric/Behavioral: Negative for altered mental status.           Objective:      Physical Exam   Constitutional: She is oriented to person, place, and time. She appears well-developed and well-nourished. No distress.   Cardiovascular:   Pulses:       Dorsalis pedis pulses are 2+ on the right side, and 2+ on the left side.        Posterior tibial pulses are 2+ on the right side, and 2+ on the left side.   CFT< 3 secs all toes bilateral foot, skin temp warm bilateral foot, diminished digital hair growth bilateral foot, no lower extremity edema bilateral.     Musculoskeletal:   No available motion 1 MTP right.    Dorsiflexed first ray with < 30 deg DF 1 MTP left foot. Dorsal medial prominence to left 1st metatarsal. No crepitus with 1 MTP ROM right.     Decreased medial longitudinal arch height bilateral with standing.       Ankle dorsiflexion decreased at <10 degrees bilateral with moderate increase with knee flexion bilateral.      Right hallux in rectus position without pain on palpation, no available 1 MTP motion.     Neurological: She is alert and oriented to person, place, and time. She has normal strength. No sensory deficit.   Positive Tinel sign without provocation sign over the deep peroneal and tibial nerves bilateral.   Skin: Skin is warm, dry and intact. Capillary refill takes less than 2 seconds. No ecchymosis and no rash noted. She is not diaphoretic. No cyanosis or erythema. No pallor. Nails show no clubbing.   Normal appearing scars right foot.                 Assessment:       Encounter Diagnoses   Name Primary?    Hallux limitus, left Yes    Preop testing     Hallux valgus, left          Plan:       Isi was seen today for follow-up.    Diagnoses and all orders for this visit:    Hallux limitus, left  -     Ambulatory referral/consult to Family Practice; Future  -     Case Request Operating Room: AMINATA CROSS  -     Full code; Standing  -     Place in Outpatient; Standing  -     Insert peripheral IV; Standing  -     Verify surgical site; Standing  -     Verify informed consent; Standing  -     Full code  -     Insert peripheral IV    Preop testing  -     Ambulatory referral/consult to Family Practice; Future    Hallux valgus, left  -     Case Request Operating Room: [facility]INATA CROSS  -     Full code; Standing  -     Place in Outpatient; Standing  -     Insert peripheral IV; Standing  -     Verify surgical site; Standing  -     Verify informed consent; Standing  -     Full code  -     Insert peripheral IV    Other orders  -     Progressive Mobility Protocol (mobilize patient to their highest level of functioning at least twice daily); Standing  -     sodium chloride 0.9% flush 10 mL  -     lidocaine (PF) 10 mg/ml (1%) injection 10 mg  -     Weight bearing Partial Weight Bearing; Lower Extremity; Left; Standing  -     ceFAZolin (ANCEF) 2 g in dextrose 5 % 50 mL IVPB  -     Progressive Mobility Protocol (mobilize patient to their highest level of functioning at least twice daily)  -      Progressive Mobility Protocol (mobilize patient to their highest level of functioning at least twice daily)  -     Weight bearing Partial Weight Bearing; Lower Extremity; Left      I counseled the patient on her conditions, their implications and medical management.    Reviewed left foot xray in detail with patient. Vaulting of navicular cuneiform joint on lateral projection. Dorsiflexed first metatarsal. Irregular joint space narrowing 1 MTP. Large dorsal exostosis first met head. Subchondral sclerosis of 1 MTP and 1 TMT. 1-2 IM angle 11 deg, HAV angle 17 deg, TSP 3.    Discussed continued conservative care such as custom orthotics, shore gear and activity modifications vs surgical intervention consisting of arthrodesis vs implant arthroplasty vs 1 TMT arthrodesis to re-align the first ray and cheilectomy using external fixation vs internal fixation. Patient elected for 1 TMT arthrodesis with 1 MTP cheilectomy using external fixation. Associated risks, benefits and postop course discussed. No guarantees given or implied.    This procedure has been fully reviewed with the patient and written informed consent has been obtained.    Referred to PCP for medical optimization and risk stratification    Scheduled for surgical intervention as MAC with regional on 3/25/2020.    Discussed she will be partial weightbearing left foot with activity and duration restrictions the first postop week with gradual advancement in weightbearing and duration.    RTC 1 week postop or prn as discussed.

## 2020-03-18 ENCOUNTER — TELEPHONE (OUTPATIENT)
Dept: PODIATRY | Facility: CLINIC | Age: 45
End: 2020-03-18

## 2021-01-06 ENCOUNTER — HOSPITAL ENCOUNTER (OUTPATIENT)
Dept: RADIOLOGY | Facility: HOSPITAL | Age: 46
Discharge: HOME | End: 2021-01-06
Attending: OBSTETRICS & GYNECOLOGY
Payer: COMMERCIAL

## 2021-01-06 DIAGNOSIS — Z12.31 ENCOUNTER FOR SCREENING MAMMOGRAM FOR MALIGNANT NEOPLASM OF BREAST: ICD-10-CM

## 2021-01-06 PROCEDURE — 77067 SCR MAMMO BI INCL CAD: CPT | Mod: 26,,, | Performed by: RADIOLOGY

## 2021-01-06 PROCEDURE — 77063 BREAST TOMOSYNTHESIS BI: CPT | Mod: 26,,, | Performed by: RADIOLOGY

## 2021-01-06 PROCEDURE — 77067 SCR MAMMO BI INCL CAD: CPT | Mod: TC

## 2021-01-06 PROCEDURE — 77063 MAMMO DIGITAL SCREENING BILAT WITH TOMO: ICD-10-PCS | Mod: 26,,, | Performed by: RADIOLOGY

## 2021-01-06 PROCEDURE — 77067 MAMMO DIGITAL SCREENING BILAT WITH TOMO: ICD-10-PCS | Mod: 26,,, | Performed by: RADIOLOGY

## 2021-04-29 ENCOUNTER — PATIENT MESSAGE (OUTPATIENT)
Dept: RESEARCH | Facility: HOSPITAL | Age: 46
End: 2021-04-29

## 2021-05-10 ENCOUNTER — HOSPITAL ENCOUNTER (OUTPATIENT)
Dept: RADIOLOGY | Facility: HOSPITAL | Age: 46
Discharge: HOME | End: 2021-05-10
Attending: STUDENT IN AN ORGANIZED HEALTH CARE EDUCATION/TRAINING PROGRAM
Payer: COMMERCIAL

## 2021-05-10 ENCOUNTER — OFFICE VISIT (OUTPATIENT)
Dept: PODIATRY | Facility: CLINIC | Age: 46
End: 2021-05-10
Payer: COMMERCIAL

## 2021-05-10 VITALS — BODY MASS INDEX: 42.53 KG/M2 | HEIGHT: 67 IN | WEIGHT: 270.94 LBS

## 2021-05-10 DIAGNOSIS — M76.62 ACHILLES TENDINITIS OF LEFT LOWER EXTREMITY: ICD-10-CM

## 2021-05-10 DIAGNOSIS — M76.62 ACHILLES TENDINITIS OF LEFT LOWER EXTREMITY: Primary | ICD-10-CM

## 2021-05-10 PROCEDURE — 99999 PR PBB SHADOW E&M-EST. PATIENT-LVL III: CPT | Mod: PBBFAC,,, | Performed by: STUDENT IN AN ORGANIZED HEALTH CARE EDUCATION/TRAINING PROGRAM

## 2021-05-10 PROCEDURE — 3008F BODY MASS INDEX DOCD: CPT | Mod: CPTII,S$GLB,, | Performed by: STUDENT IN AN ORGANIZED HEALTH CARE EDUCATION/TRAINING PROGRAM

## 2021-05-10 PROCEDURE — 73630 X-RAY EXAM OF FOOT: CPT | Mod: TC,FY,PO,LT

## 2021-05-10 PROCEDURE — 99999 PR PBB SHADOW E&M-EST. PATIENT-LVL III: ICD-10-PCS | Mod: PBBFAC,,, | Performed by: STUDENT IN AN ORGANIZED HEALTH CARE EDUCATION/TRAINING PROGRAM

## 2021-05-10 PROCEDURE — 1125F AMNT PAIN NOTED PAIN PRSNT: CPT | Mod: S$GLB,,, | Performed by: STUDENT IN AN ORGANIZED HEALTH CARE EDUCATION/TRAINING PROGRAM

## 2021-05-10 PROCEDURE — 99214 PR OFFICE/OUTPT VISIT, EST, LEVL IV, 30-39 MIN: ICD-10-PCS | Mod: S$GLB,,, | Performed by: STUDENT IN AN ORGANIZED HEALTH CARE EDUCATION/TRAINING PROGRAM

## 2021-05-10 PROCEDURE — 99214 OFFICE O/P EST MOD 30 MIN: CPT | Mod: S$GLB,,, | Performed by: STUDENT IN AN ORGANIZED HEALTH CARE EDUCATION/TRAINING PROGRAM

## 2021-05-10 PROCEDURE — 1125F PR PAIN SEVERITY QUANTIFIED, PAIN PRESENT: ICD-10-PCS | Mod: S$GLB,,, | Performed by: STUDENT IN AN ORGANIZED HEALTH CARE EDUCATION/TRAINING PROGRAM

## 2021-05-10 PROCEDURE — 3008F PR BODY MASS INDEX (BMI) DOCUMENTED: ICD-10-PCS | Mod: CPTII,S$GLB,, | Performed by: STUDENT IN AN ORGANIZED HEALTH CARE EDUCATION/TRAINING PROGRAM

## 2022-10-10 ENCOUNTER — TELEPHONE (OUTPATIENT)
Dept: PODIATRY | Facility: CLINIC | Age: 47
End: 2022-10-10
Payer: COMMERCIAL

## 2022-10-10 NOTE — TELEPHONE ENCOUNTER
Spoke with pt in regards to scheduling an appointment. Pt stated that She wants to go head and schedule surgery with Dr. Centeno. Pt was given Dr. Centeno next available appointment. Pt agreed to date and time and call ended. ----- Message from David Pierre sent at 10/10/2022  3:37 PM CDT -----  Type:  Patient Returning Call    Who Called:Pt  Who Left Message for Patient:Joe Alcazar  Does the patient know what this is regarding?: no   Would the patient rather a call back or a response via [facility]? Call   Best Call Back Number:   Additional Information:

## 2022-11-21 ENCOUNTER — HOSPITAL ENCOUNTER (OUTPATIENT)
Dept: RADIOLOGY | Facility: HOSPITAL | Age: 47
Discharge: HOME | End: 2022-11-21
Attending: PODIATRIST
Payer: COMMERCIAL

## 2022-11-21 ENCOUNTER — OFFICE VISIT (OUTPATIENT)
Dept: PODIATRY | Facility: CLINIC | Age: 47
End: 2022-11-21
Payer: COMMERCIAL

## 2022-11-21 VITALS
WEIGHT: 270 LBS | DIASTOLIC BLOOD PRESSURE: 72 MMHG | BODY MASS INDEX: 42.38 KG/M2 | HEIGHT: 67 IN | HEART RATE: 92 BPM | SYSTOLIC BLOOD PRESSURE: 127 MMHG

## 2022-11-21 DIAGNOSIS — M20.5X2 HALLUX LIMITUS, LEFT: Primary | ICD-10-CM

## 2022-11-21 DIAGNOSIS — Z01.818 PREOP TESTING: ICD-10-CM

## 2022-11-21 DIAGNOSIS — M20.5X2 HALLUX LIMITUS, LEFT: ICD-10-CM

## 2022-11-21 PROCEDURE — 3078F PR MOST RECENT DIASTOLIC BLOOD PRESSURE < 80 MM HG: ICD-10-PCS | Mod: CPTII,S$GLB,, | Performed by: PODIATRIST

## 2022-11-21 PROCEDURE — 99999 PR PBB SHADOW E&M-EST. PATIENT-LVL V: ICD-10-PCS | Mod: PBBFAC,,, | Performed by: PODIATRIST

## 2022-11-21 PROCEDURE — 1160F RVW MEDS BY RX/DR IN RCRD: CPT | Mod: CPTII,S$GLB,, | Performed by: PODIATRIST

## 2022-11-21 PROCEDURE — 1159F PR MEDICATION LIST DOCUMENTED IN MEDICAL RECORD: ICD-10-PCS | Mod: CPTII,S$GLB,, | Performed by: PODIATRIST

## 2022-11-21 PROCEDURE — 3078F DIAST BP <80 MM HG: CPT | Mod: CPTII,S$GLB,, | Performed by: PODIATRIST

## 2022-11-21 PROCEDURE — 73630 X-RAY EXAM OF FOOT: CPT | Mod: TC,FY,LT

## 2022-11-21 PROCEDURE — 3074F SYST BP LT 130 MM HG: CPT | Mod: CPTII,S$GLB,, | Performed by: PODIATRIST

## 2022-11-21 PROCEDURE — 3008F PR BODY MASS INDEX (BMI) DOCUMENTED: ICD-10-PCS | Mod: CPTII,S$GLB,, | Performed by: PODIATRIST

## 2022-11-21 PROCEDURE — 99214 OFFICE O/P EST MOD 30 MIN: CPT | Mod: 57,S$GLB,, | Performed by: PODIATRIST

## 2022-11-21 PROCEDURE — 3074F PR MOST RECENT SYSTOLIC BLOOD PRESSURE < 130 MM HG: ICD-10-PCS | Mod: CPTII,S$GLB,, | Performed by: PODIATRIST

## 2022-11-21 PROCEDURE — 99214 PR OFFICE/OUTPT VISIT, EST, LEVL IV, 30-39 MIN: ICD-10-PCS | Mod: 57,S$GLB,, | Performed by: PODIATRIST

## 2022-11-21 PROCEDURE — 4010F ACE/ARB THERAPY RXD/TAKEN: CPT | Mod: CPTII,S$GLB,, | Performed by: PODIATRIST

## 2022-11-21 PROCEDURE — 1160F PR REVIEW ALL MEDS BY PRESCRIBER/CLIN PHARMACIST DOCUMENTED: ICD-10-PCS | Mod: CPTII,S$GLB,, | Performed by: PODIATRIST

## 2022-11-21 PROCEDURE — 73630 XR FOOT COMPLETE 3 VIEW LEFT: ICD-10-PCS | Mod: 26,LT,, | Performed by: RADIOLOGY

## 2022-11-21 PROCEDURE — 73630 X-RAY EXAM OF FOOT: CPT | Mod: 26,LT,, | Performed by: RADIOLOGY

## 2022-11-21 PROCEDURE — 1159F MED LIST DOCD IN RCRD: CPT | Mod: CPTII,S$GLB,, | Performed by: PODIATRIST

## 2022-11-21 PROCEDURE — 99999 PR PBB SHADOW E&M-EST. PATIENT-LVL V: CPT | Mod: PBBFAC,,, | Performed by: PODIATRIST

## 2022-11-21 PROCEDURE — 3008F BODY MASS INDEX DOCD: CPT | Mod: CPTII,S$GLB,, | Performed by: PODIATRIST

## 2022-11-21 PROCEDURE — 4010F PR ACE/ARB THEARPY RXD/TAKEN: ICD-10-PCS | Mod: CPTII,S$GLB,, | Performed by: PODIATRIST

## 2022-11-21 RX ORDER — LISINOPRIL AND HYDROCHLOROTHIAZIDE 10; 12.5 MG/1; MG/1
1 TABLET ORAL DAILY
COMMUNITY

## 2022-11-21 RX ORDER — CEFAZOLIN SODIUM 2 G/50ML
2 SOLUTION INTRAVENOUS
Status: CANCELLED | OUTPATIENT
Start: 2022-11-21

## 2022-11-21 RX ORDER — SODIUM CHLORIDE 0.9 % (FLUSH) 0.9 %
10 SYRINGE (ML) INJECTION
Status: SHIPPED | OUTPATIENT
Start: 2022-11-21

## 2022-11-21 RX ORDER — CALCIUM CARBONATE 500(1250)
1 TABLET ORAL DAILY
Qty: 90 TABLET | Refills: 1 | Status: SHIPPED | OUTPATIENT
Start: 2022-11-21 | End: 2023-05-04

## 2022-11-21 RX ORDER — DAPAGLIFLOZIN AND METFORMIN HYDROCHLORIDE 5; 1000 MG/1; MG/1
1 TABLET, FILM COATED, EXTENDED RELEASE ORAL NIGHTLY
COMMUNITY

## 2022-11-21 RX ORDER — CHOLECALCIFEROL (VITAMIN D3) 1250 MCG
1250 TABLET ORAL
Qty: 12 TABLET | Refills: 1 | Status: SHIPPED | OUTPATIENT
Start: 2022-11-21 | End: 2023-05-04

## 2022-11-21 NOTE — PROGRESS NOTES
"Subjective:      Patient ID: Isi Herrera is a 47 y.o. female.    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  She was previously scheduled for surgical intervention a couple years ago however had to cancel secondary to COVID 19.  Relates pain has worsened and she is unable to find any relief.  She feels that she has failed conservative care at this time.  History of 1st metatarsophalangeal joint arthrodesis on the right foot.  No symptoms to the right foot today.  History smoking greater than 10 years ago.    Vitals:    11/21/22 1424   BP: 127/72   Pulse: 92   Weight: 122.5 kg (270 lb)   Height: 5' 7" (1.702 m)   PainSc:   6   PainLoc: Foot      Past Medical History:   Diagnosis Date    Thyroid disease     Hypothyroid       Past Surgical History:   Procedure Laterality Date    BUNIONECTOMY Right 2009    FUSION OF METATARSOPHALANGEAL JOINT Right 8/15/2018    Procedure: FUSION, MTP JOINT;  Surgeon: Ace Centeno DPM;  Location: [facility];  Service: Podiatry;  Laterality: Right;  mini c-arm, possible autograft bone harvest from calcaneus, Arthrex locking plate ([facility]air notified)    HEMORRHOID SURGERY      HYSTERECTOMY      full 2009    OOPHORECTOMY         Family History   Problem Relation Age of Onset    Graves' disease Mother     Arthritis Father     Prostate cancer Father     Diabetes Sister        Social History     Socioeconomic History    Marital status:    Tobacco Use    Smoking status: Never   Substance and Sexual Activity    Alcohol use: No    Drug use: No    Sexual activity: Yes       Current Outpatient Medications   Medication Sig Dispense Refill    dapagliflozin-metformin (XIGDUO XR) 5-1,000 mg Take 1 tablet by mouth every evening.      hydroCHLOROthiazide (MICROZIDE) 12.5 mg capsule       levothyroxine (SYNTHROID) 75 MCG tablet       " lisinopriL-hydrochlorothiazide (PRINZIDE,ZESTORETIC) 10-12.5 mg per tablet Take 1 tablet by mouth once daily.      semaglutide (RYBELSUS ORAL) Take 10 mg by mouth every evening.      SYNTHROID 100 mcg tablet       traZODone (DESYREL) 50 MG tablet       ascorbic acid, vitamin C, (VITAMIN C DROPS) 60 mg Lozg Take by mouth.      calcium carbonate (OS-EDMUNDO) 500 mg calcium (1,250 mg) tablet Take 1 tablet (500 mg total) by mouth once daily. 90 tablet 1    cholecalciferol, vitamin D3, 1,250 mcg (50,000 unit) Tab Take 1,250 mcg by mouth every 7 days. 12 tablet 1    estradiol (ESTRACE) 2 MG tablet       gabapentin (NEURONTIN) 300 MG capsule       ibuprofen (ADVIL,MOTRIN) 800 MG tablet Take 1 tablet (800 mg total) by mouth 3 (three) times daily. 30 tablet 1     Current Facility-Administered Medications   Medication Dose Route Frequency Provider Last Rate Last Admin    sodium chloride 0.9% flush 10 mL  10 mL Intravenous PRN Ace Centeno DPM        sodium chloride 0.9% flush 10 mL  10 mL Intravenous PRN Ace Centeno DPM           Review of patient's allergies indicates:  No Known Allergies        Review of Systems   Constitutional: Negative for chills, fever and malaise/fatigue.   Cardiovascular:  Negative for chest pain, claudication and leg swelling.   Respiratory:  Negative for cough and shortness of breath.    Musculoskeletal:  Negative for back pain, joint pain, muscle cramps and muscle weakness.   Gastrointestinal:  Negative for nausea and vomiting.   Neurological:  Negative for numbness, paresthesias and weakness.   Psychiatric/Behavioral:  Negative for altered mental status.          Objective:      Physical Exam  Constitutional:       General: She is not in acute distress.     Appearance: Normal appearance. She is obese. She is not ill-appearing.   Cardiovascular:      Pulses:           Dorsalis pedis pulses are 2+ on the right side and 2+ on the left side.        Posterior tibial pulses are 2+ on the  right side and 2+ on the left side.   Musculoskeletal:      Comments: Large palpable dorsal exostosis 1 MTP left foot.  Localized pain on palpation and edema to the dorsal 1 MTP left foot.  One MTP range of motion with no available dorsiflexion which is painful noting up to 10° of plantar flexion.  Dorsiflexed 1st ray left foot.  Overall rectus appearance to the left foot.   Skin:     General: Skin is warm.      Capillary Refill: Capillary refill takes less than 2 seconds.      Findings: No ecchymosis or erythema.      Nails: There is no clubbing.   Neurological:      Mental Status: She is alert.             Assessment:       Encounter Diagnoses   Name Primary?    Hallux limitus, left Yes    Preop testing          Plan:       Isi was seen today for bunions.    Diagnoses and all orders for this visit:    Hallux limitus, left  -     X-Ray Foot Complete Left; Future  -     Ambulatory referral/consult to Internal Medicine; Future    Preop testing  -     Ambulatory referral/consult to Internal Medicine; Future    Other orders  -     cholecalciferol, vitamin D3, 1,250 mcg (50,000 unit) Tab; Take 1,250 mcg by mouth every 7 days.  -     calcium carbonate (OS-EDMUNDO) 500 mg calcium (1,250 mg) tablet; Take 1 tablet (500 mg total) by mouth once daily.      I counseled the patient on her conditions, their implications and medical management.    Reviewed previous left foot x-ray complete 2 years ago in comparison to the 1 a year ago no ring significant loss of the 1st metatarsophalangeal joint space with large dorsal exostosis and progressive overall arthrosis.  We discussed obtaining another follow-up weight-bearing left foot x-ray.  We discussed continued conservative care versus surgical intervention consisting of 1st metatarsophalangeal joint arthrodesis left foot.  Risks, benefits anterior postop course discussed in detail.  No guarantees were given or implied.  Patient elected to proceed surgical intervention outlined  above.      This procedure has been fully reviewed with the patient and written informed consent has been obtained.    Referred to PCP for medical optimization and risk stratification    Surgical intervention scheduled for 01/04/2023 as mac with local anesthetic.  We briefly reviewed weight-bearing restrictions using a short cam boot for up to 8 weeks.    RTC 1 week postop or prn as discussed.    A portion of this note was generated by voice recognition software and may contain spelling and grammar errors.  .

## 2022-11-23 ENCOUNTER — PATIENT MESSAGE (OUTPATIENT)
Dept: PODIATRY | Facility: CLINIC | Age: 47
End: 2022-11-23
Payer: COMMERCIAL

## 2022-11-23 ENCOUNTER — PATIENT MESSAGE (OUTPATIENT)
Dept: SURGERY | Facility: HOSPITAL | Age: 47
End: 2022-11-23
Payer: COMMERCIAL

## 2022-12-13 ENCOUNTER — TELEPHONE (OUTPATIENT)
Dept: PODIATRY | Facility: CLINIC | Age: 47
End: 2022-12-13
Payer: COMMERCIAL

## 2022-12-13 NOTE — TELEPHONE ENCOUNTER
Spoke with pt in regards to f/u clearance for surgery. Pt stated she have a appointment Monday with her primary care physician. Pt was advise to have him fax over her clearance for surgery. Pt was given our fax number. She stated her understanding. Call ended. ----- Message from Sangeetha Buckner sent at 12/13/2022 11:11 AM CST -----  Regarding: Returning call  Contact: Patient  .Type:  Patient Returning Call    Who Called:Patient  Who Left Message for Patient:Joe aparicio  Does the patient know what this is regarding?:clearance for surgery  Would the patient rather a call back or a response via [facility]? call  Best Call Back Number:[phone]  Additional Information: Pt stated she has an appt Monday 12/19/2022 with a [facility] provider.

## 2022-12-13 NOTE — TELEPHONE ENCOUNTER
Called pt to see if she saw her PCP for surgical clearance. Pt didn't answer. Left VM asking for a call back. Will F/U with pt again.

## 2022-12-19 ENCOUNTER — TELEPHONE (OUTPATIENT)
Dept: PREADMISSION TESTING | Facility: HOSPITAL | Age: 47
End: 2022-12-19
Payer: COMMERCIAL

## 2022-12-28 ENCOUNTER — ANESTHESIA EVENT (OUTPATIENT)
Dept: SURGERY | Facility: HOSPITAL | Age: 47
End: 2022-12-28
Payer: COMMERCIAL

## 2022-12-28 ENCOUNTER — HOSPITAL ENCOUNTER (OUTPATIENT)
Dept: PREADMISSION TESTING | Facility: HOSPITAL | Age: 47
Discharge: HOME | End: 2022-12-28
Attending: PODIATRIST
Payer: COMMERCIAL

## 2022-12-28 VITALS
HEART RATE: 74 BPM | WEIGHT: 254 LBS | SYSTOLIC BLOOD PRESSURE: 135 MMHG | DIASTOLIC BLOOD PRESSURE: 78 MMHG | RESPIRATION RATE: 16 BRPM | HEIGHT: 67 IN | TEMPERATURE: 98 F | OXYGEN SATURATION: 99 % | BODY MASS INDEX: 39.87 KG/M2

## 2022-12-28 PROBLEM — F51.04 CHRONIC INSOMNIA: Status: ACTIVE | Noted: 2022-02-22

## 2022-12-28 PROBLEM — E03.9 HYPOTHYROIDISM: Status: ACTIVE | Noted: 2022-02-22

## 2022-12-28 PROBLEM — G62.9 PERIPHERAL NEUROPATHY: Status: ACTIVE | Noted: 2022-02-22

## 2022-12-28 PROBLEM — E11.9 TYPE 2 DIABETES MELLITUS: Status: ACTIVE | Noted: 2022-02-22

## 2022-12-28 PROBLEM — K58.9 IRRITABLE BOWEL SYNDROME: Status: ACTIVE | Noted: 2022-02-22

## 2022-12-28 PROBLEM — E78.5 HYPERLIPIDEMIA: Status: ACTIVE | Noted: 2022-02-22

## 2022-12-28 PROBLEM — K57.30 DIVERTICULOSIS OF LARGE INTESTINE WITHOUT HEMORRHAGE: Status: ACTIVE | Noted: 2022-02-22

## 2022-12-28 PROBLEM — N95.1 VASOMOTOR SYMPTOMS DUE TO MENOPAUSE: Status: ACTIVE | Noted: 2022-02-22

## 2022-12-28 PROBLEM — F41.8 SITUATIONAL ANXIETY: Status: ACTIVE | Noted: 2022-02-22

## 2022-12-28 PROBLEM — H34.8192 CENTRAL RETINAL VEIN OCCLUSION: Status: ACTIVE | Noted: 2022-02-22

## 2022-12-28 PROBLEM — I10 HYPERTENSION: Status: ACTIVE | Noted: 2022-02-22

## 2022-12-28 RX ORDER — ALPRAZOLAM 0.5 MG/1
0.5 TABLET ORAL 2 TIMES DAILY PRN
Status: ON HOLD | COMMUNITY
Start: 2022-08-24 | End: 2023-01-04 | Stop reason: HOSPADM

## 2022-12-28 RX ORDER — SODIUM CHLORIDE, SODIUM LACTATE, POTASSIUM CHLORIDE, CALCIUM CHLORIDE 600; 310; 30; 20 MG/100ML; MG/100ML; MG/100ML; MG/100ML
INJECTION, SOLUTION INTRAVENOUS CONTINUOUS
Status: CANCELLED | OUTPATIENT
Start: 2022-12-28

## 2022-12-28 RX ORDER — LIDOCAINE HYDROCHLORIDE 10 MG/ML
1 INJECTION, SOLUTION EPIDURAL; INFILTRATION; INTRACAUDAL; PERINEURAL ONCE
Status: CANCELLED | OUTPATIENT
Start: 2022-12-28 | End: 2022-12-28

## 2022-12-28 RX ORDER — MELOXICAM 15 MG/1
15 TABLET ORAL
Status: ON HOLD | COMMUNITY
Start: 2022-07-28 | End: 2023-01-04 | Stop reason: HOSPADM

## 2022-12-28 RX ORDER — TIRZEPATIDE 2.5 MG/.5ML
2.5 INJECTION, SOLUTION SUBCUTANEOUS
COMMUNITY

## 2022-12-28 RX ORDER — ERYTHROMYCIN 5 MG/G
OINTMENT OPHTHALMIC
COMMUNITY
Start: 2022-08-24

## 2022-12-28 NOTE — PRE-PROCEDURE INSTRUCTIONS
Silas Gonzalez [phone]    Allergies, medical, surgical, family and psychosocial histories reviewed with patient. Periop plan of care reviewed. Preop instructions given, including medications to take and to hold. Hibiclens soap and instructions on use given. Time allotted for questions to be addressed.  Patient verbalized understanding.

## 2022-12-28 NOTE — DISCHARGE INSTRUCTIONS
Your surgery is scheduled for 1/4/23.    Please report to [facility] on the 1st Floor at 0730 a.m.    THIS TIME IS SUBJECT TO CHANGE.  YOU WILL RECEIVE A PHONE CALL THE DAY BEFORE SURGERY BY 3:30 PM TO CONFIRM YOUR TIME OF ARRIVAL.  IF YOU HAVE NOT RECEIVED A PHONE CALL BY 3:30 PM THE DAY BEFORE YOUR SURGERY PLEASE CALL [phone]     INSTRUCTIONS IMPORTANT!!!  ¨ Do not eat or drink after 12 midnight-including water, candy, gum, & mints. OK to brush teeth.      ____  Proceed to [facility] on *** for additional testing.        ____  Do not wear makeup, including mascara.  ____  No powder, lotions or creams to surgical area.  ____  Please remove all jewelry, including piercings and leave at home.  ____  No money or valuables needed. Please leave at home.  ____  Please bring any documents given by your doctor.  ____  If going home the same day, arrange for a ride home. You will not be able to             drive if Anesthesia was used.  ____  Children under 18 years require a parent / guardian present the entire time             they are in surgery / recovery.  ____  Wear loose fitting clothing. Allow for dressings, bandages.  ____  Stop Aspirin, Ibuprofen, Motrin, Aleve, Goody's/BC powders, Excedrine and Naproxen (NSAIDS) at least 3-5 days before surgery, unless otherwise instructed by your doctor, or the nurse.   You MAY use Tylenol/acetaminophen until day of surgery.  ____  Wash the surgical area with Hibiclens the night before surgery, and again the             morning of surgery.  Be sure to rinse hibiclens off completely (if instructed by   nurse).  ____  If you take diabetic medication, do not take am of surgery unless instructed by Doctor.  ____  Call MD for temperature above 101 degrees or any other signs of infection such as Urinary (bladder) infection, Upper respiratory infection, skin boils, etc.  ____ Stop taking any Fish Oil supplement or any Vitamins that contain Vitamin E at  least 5 days prior to surgery.  ____ Do Not wear your contact lenses the day of your procedure.  You may wear your glasses.      ____Do not shave surgical site for 3 days prior to surgery.  ____ Practice Good hand washing before, during, and after procedure.      I have read or had read and explained to me, and understand the above information.  Additional comments or instructions:  For additional questions call [phone]      ANESTHESIA SIDE EFFECTS  -For the first 24 hours after surgery:  Do not drive, use heavy equipment, make important decisions, or drink alcohol  -It is normal to feel sleepy for several hours.  Rest until you are more awake.  -Have someone stay with you, if needed.  They can watch for problems and help keep you safe.  -Some possible post anesthesia side effects include: nausea and vomiting, sore throat and hoarseness, sleepiness, and dizziness.        Pre-Op Bathing Instructions    Before surgery, you can play an important role in your own health.    Because skin is not sterile, we need to be sure that your skin is as free of germs as possible. By following the instructions below, you can reduce the number of germs on your skin before surgery.    IMPORTANT: You will need to shower with a special soap called Hibiclens*, available at any pharmacy.  If you are allergic to Chlorhexidine (the antiseptic in Hibiclens), use an antibacterial soap such as Dial Soap for your preoperative shower.  You will shower with Hibiclens both the night before your surgery and the morning of your surgery.  Do not use Hibiclens on the head, face or genitals to avoid injury to those areas.    STEP #1: THE NIGHT BEFORE YOUR SURGERY     Do not shave the area of your body where your surgery will be performed.  Shower and wash your hair and body as usual with your normal soap and shampoo.  Rinse your hair and body thoroughly after you shower to remove all soap residue.  With your hand, apply one packet of Hibiclens soap to  the surgical site.   Wash the site gently for five (5) minutes. Do not scrub your skin too hard.   Do not wash with your regular soap after Hibiclens is used.  Rinse your body thoroughly.  Pat yourself dry with a clean, soft towel.  Do not use lotion, cream, or powder.  Wear clean clothes.    STEP #2: THE MORNING OF YOUR SURGERY     Repeat Step #1.    * Not to be used by people allergic to Chlorhexidine.

## 2022-12-28 NOTE — ANESTHESIA PREPROCEDURE EVALUATION
12/28/2022  Isi Herrera is a 47 y.o., female scheduled for FUSION, MTP JOINT (Left: Toe) 1/4/23.    PCP clearance in media    Past Medical History:   Diagnosis Date    Thyroid disease     Hypothyroid     Past Surgical History:   Procedure Laterality Date    BUNIONECTOMY Right 2009    FUSION OF METATARSOPHALANGEAL JOINT Right 8/15/2018    Procedure: FUSION, MTP JOINT;  Surgeon: Ace Centeno DPM;  Location: [facility];  Service: Podiatry;  Laterality: Right;  mini c-arm, possible autograft bone harvest from calcaneus, Arthrex locking plate (Clair notified)    HEMORRHOID SURGERY      HYSTERECTOMY      full 2009    OOPHORECTOMY         Pre-op Assessment    I have reviewed the Patient Summary Reports.     I have reviewed the Nursing Notes.    I have reviewed the Medications.     Review of Systems  Anesthesia Hx:  No problems with previous Anesthesia   Denies Personal Hx of Anesthesia complications.   Social:  Former Smoker, Social Alcohol Use    Cardiovascular:   Exercise tolerance: good Hypertension, well controlled  Denies Angina. hyperlipidemia    Pulmonary:  Pulmonary Normal  Denies Shortness of breath.    Renal/:  Renal/ Normal     Hepatic/GI:  Hepatic/GI Normal    Musculoskeletal:  Musculoskeletal Normal    Neurological:   Denies TIA. Denies CVA. Denies Seizures.   Peripheral Neuropathy    Endocrine:   Diabetes, well controlled, type 2 Hypothyroidism  Obesity / BMI > 30  Psych:  Psychiatric Normal           Physical Exam  General: Well nourished and Cooperative    Airway:  Mallampati: IV / II  Mouth Opening: Normal  TM Distance: Normal  Tongue: Normal, Large  Neck ROM: Normal ROM    Dental:  Intact    Chest/Lungs:  Clear to auscultation, Normal Respiratory Rate    Heart:  Rate: Normal  Rhythm: Regular Rhythm  Sounds: Normal      Lab Results   Component Value Date    WBC 8.84  08/13/2018    HGB 13.1 08/13/2018    HCT 38.4 08/13/2018     08/13/2018     08/13/2018    K 3.5 08/13/2018     08/13/2018    CREATININE 0.8 08/13/2018    BUN 13 08/13/2018    CO2 28 08/13/2018     No results found for this or any previous visit.      Anesthesia Plan  Type of Anesthesia, risks & benefits discussed:    Anesthesia Type: MAC  Intra-op Monitoring Plan: Standard ASA Monitors  Post Op Pain Control Plan: multimodal analgesia  Induction:  IV  Informed Consent: Informed consent signed with the Patient and all parties understand the risks and agree with anesthesia plan.  All questions answered.   ASA Score: 2  Day of Surgery Review of History & Physical: H&P completed by Anesthesiologist.  Anesthesia Plan Notes: Anesthesia consent to be signed prior to surgery 1/4/23      Ready For Surgery From Anesthesia Perspective.     .

## 2023-01-01 NOTE — TELEPHONE ENCOUNTER
Called pt in regards to scheduling an appointment. Pt didn't answer. Left VM asking pt to call back to schedule an appointment.. ----- Message from Emmanuel Curiel sent at 10/10/2022  3:16 PM CDT -----  Contact: pt  .Type:  Sooner Apoointment Request    Caller is requesting a sooner appointment.  Caller declined first available appointment listed below.  Caller will not accept being placed on the waitlist and is requesting a message be sent to doctor.  Name of Caller:pt  When is the first available appointment?  Symptoms:left foot pain   Would the patient rather a call back or a response via [facility]? Call back  Best Call Back Number:[phone]  Additional Information: Left foot pain ready to schedule her surgery.       
Statement Selected

## 2023-01-04 ENCOUNTER — ANESTHESIA (OUTPATIENT)
Dept: SURGERY | Facility: HOSPITAL | Age: 48
End: 2023-01-04
Payer: COMMERCIAL

## 2023-01-04 ENCOUNTER — HOSPITAL ENCOUNTER (OUTPATIENT)
Facility: HOSPITAL | Age: 48
Discharge: HOME | End: 2023-01-04
Attending: PODIATRIST | Admitting: PODIATRIST
Payer: COMMERCIAL

## 2023-01-04 VITALS
HEIGHT: 67 IN | HEART RATE: 75 BPM | SYSTOLIC BLOOD PRESSURE: 131 MMHG | WEIGHT: 256 LBS | RESPIRATION RATE: 17 BRPM | BODY MASS INDEX: 40.18 KG/M2 | TEMPERATURE: 98 F | OXYGEN SATURATION: 97 % | DIASTOLIC BLOOD PRESSURE: 78 MMHG

## 2023-01-04 DIAGNOSIS — M20.5X2 HALLUX LIMITUS, LEFT: ICD-10-CM

## 2023-01-04 DIAGNOSIS — Z98.890 POSTOPERATIVE STATE: Primary | ICD-10-CM

## 2023-01-04 LAB
POCT GLUCOSE: 44 MG/DL (ref 70–110)
POCT GLUCOSE: 96 MG/DL (ref 70–110)

## 2023-01-04 PROCEDURE — 37000008 HC ANESTHESIA 1ST 15 MINUTES: Performed by: PODIATRIST

## 2023-01-04 PROCEDURE — 71000015 HC POSTOP RECOV 1ST HR: Performed by: PODIATRIST

## 2023-01-04 PROCEDURE — 63600175 PHARM REV CODE 636 W HCPCS: Performed by: NURSE PRACTITIONER

## 2023-01-04 PROCEDURE — 28750 PR FUSION BIG TOE,MT-P JT: ICD-10-PCS | Mod: TA,,, | Performed by: PODIATRIST

## 2023-01-04 PROCEDURE — 25000003 PHARM REV CODE 250: Performed by: PODIATRIST

## 2023-01-04 PROCEDURE — 37000009 HC ANESTHESIA EA ADD 15 MINS: Performed by: PODIATRIST

## 2023-01-04 PROCEDURE — D9220A PRA ANESTHESIA: Mod: CRNA,,, | Performed by: STUDENT IN AN ORGANIZED HEALTH CARE EDUCATION/TRAINING PROGRAM

## 2023-01-04 PROCEDURE — C1769 GUIDE WIRE: HCPCS | Performed by: PODIATRIST

## 2023-01-04 PROCEDURE — 25000003 PHARM REV CODE 250: Performed by: STUDENT IN AN ORGANIZED HEALTH CARE EDUCATION/TRAINING PROGRAM

## 2023-01-04 PROCEDURE — 36000708 HC OR TIME LEV III 1ST 15 MIN: Performed by: PODIATRIST

## 2023-01-04 PROCEDURE — 71000016 HC POSTOP RECOV ADDL HR: Performed by: PODIATRIST

## 2023-01-04 PROCEDURE — 63600175 PHARM REV CODE 636 W HCPCS: Performed by: STUDENT IN AN ORGANIZED HEALTH CARE EDUCATION/TRAINING PROGRAM

## 2023-01-04 PROCEDURE — 28750 FUSION OF BIG TOE JOINT: CPT | Mod: TA,,, | Performed by: PODIATRIST

## 2023-01-04 PROCEDURE — 36000709 HC OR TIME LEV III EA ADD 15 MIN: Performed by: PODIATRIST

## 2023-01-04 PROCEDURE — D9220A PRA ANESTHESIA: ICD-10-PCS | Mod: CRNA,,, | Performed by: STUDENT IN AN ORGANIZED HEALTH CARE EDUCATION/TRAINING PROGRAM

## 2023-01-04 PROCEDURE — C1713 ANCHOR/SCREW BN/BN,TIS/BN: HCPCS | Performed by: PODIATRIST

## 2023-01-04 PROCEDURE — D9220A PRA ANESTHESIA: ICD-10-PCS | Mod: ANES,,, | Performed by: ANESTHESIOLOGY

## 2023-01-04 PROCEDURE — D9220A PRA ANESTHESIA: Mod: ANES,,, | Performed by: ANESTHESIOLOGY

## 2023-01-04 PROCEDURE — 27201423 OPTIME MED/SURG SUP & DEVICES STERILE SUPPLY: Performed by: PODIATRIST

## 2023-01-04 DEVICE — SCREW COMPR FT 3.5 MINI 34MM: Type: IMPLANTABLE DEVICE | Site: FOOT | Status: FUNCTIONAL

## 2023-01-04 RX ORDER — BUPIVACAINE HYDROCHLORIDE 2.5 MG/ML
INJECTION, SOLUTION EPIDURAL; INFILTRATION; INTRACAUDAL
Status: DISCONTINUED | OUTPATIENT
Start: 2023-01-04 | End: 2023-01-04 | Stop reason: HOSPADM

## 2023-01-04 RX ORDER — ONDANSETRON HYDROCHLORIDE 8 MG/1
8 TABLET, FILM COATED ORAL EVERY 12 HOURS PRN
Qty: 3 TABLET | Refills: 0 | Status: SHIPPED | OUTPATIENT
Start: 2023-01-04 | End: 2023-05-04

## 2023-01-04 RX ORDER — LIDOCAINE HCL/PF 100 MG/5ML
SYRINGE (ML) INTRAVENOUS
Status: DISCONTINUED | OUTPATIENT
Start: 2023-01-04 | End: 2023-01-04

## 2023-01-04 RX ORDER — LIDOCAINE HYDROCHLORIDE 10 MG/ML
INJECTION, SOLUTION EPIDURAL; INFILTRATION; INTRACAUDAL; PERINEURAL
Status: DISCONTINUED | OUTPATIENT
Start: 2023-01-04 | End: 2023-01-04 | Stop reason: HOSPADM

## 2023-01-04 RX ORDER — MIDAZOLAM HYDROCHLORIDE 1 MG/ML
INJECTION INTRAMUSCULAR; INTRAVENOUS
Status: DISCONTINUED | OUTPATIENT
Start: 2023-01-04 | End: 2023-01-04

## 2023-01-04 RX ORDER — SODIUM CHLORIDE, SODIUM LACTATE, POTASSIUM CHLORIDE, CALCIUM CHLORIDE 600; 310; 30; 20 MG/100ML; MG/100ML; MG/100ML; MG/100ML
INJECTION, SOLUTION INTRAVENOUS CONTINUOUS
Status: DISCONTINUED | OUTPATIENT
Start: 2023-01-04 | End: 2023-01-04 | Stop reason: HOSPADM

## 2023-01-04 RX ORDER — CEPHALEXIN 500 MG/1
500 CAPSULE ORAL 4 TIMES DAILY
Qty: 28 CAPSULE | Refills: 0 | Status: SHIPPED | OUTPATIENT
Start: 2023-01-04 | End: 2023-05-04

## 2023-01-04 RX ORDER — PROPOFOL 10 MG/ML
INJECTION, EMULSION INTRAVENOUS CONTINUOUS PRN
Status: DISCONTINUED | OUTPATIENT
Start: 2023-01-04 | End: 2023-01-04

## 2023-01-04 RX ORDER — IBUPROFEN 800 MG/1
800 TABLET ORAL EVERY 6 HOURS PRN
Qty: 30 TABLET | Refills: 1 | Status: SHIPPED | OUTPATIENT
Start: 2023-01-04 | End: 2023-05-04

## 2023-01-04 RX ORDER — HYDROMORPHONE HYDROCHLORIDE 2 MG/ML
0.2 INJECTION, SOLUTION INTRAMUSCULAR; INTRAVENOUS; SUBCUTANEOUS EVERY 5 MIN PRN
Status: DISCONTINUED | OUTPATIENT
Start: 2023-01-04 | End: 2023-01-04 | Stop reason: HOSPADM

## 2023-01-04 RX ORDER — LIDOCAINE HYDROCHLORIDE 10 MG/ML
1 INJECTION, SOLUTION EPIDURAL; INFILTRATION; INTRACAUDAL; PERINEURAL ONCE
Status: DISCONTINUED | OUTPATIENT
Start: 2023-01-04 | End: 2023-01-04 | Stop reason: HOSPADM

## 2023-01-04 RX ORDER — ONDANSETRON 2 MG/ML
4 INJECTION INTRAMUSCULAR; INTRAVENOUS ONCE AS NEEDED
Status: DISCONTINUED | OUTPATIENT
Start: 2023-01-04 | End: 2023-01-04 | Stop reason: HOSPADM

## 2023-01-04 RX ORDER — CEFAZOLIN SODIUM 1 G/3ML
INJECTION, POWDER, FOR SOLUTION INTRAMUSCULAR; INTRAVENOUS
Status: DISCONTINUED | OUTPATIENT
Start: 2023-01-04 | End: 2023-01-04

## 2023-01-04 RX ORDER — HYDROCODONE BITARTRATE AND ACETAMINOPHEN 5; 325 MG/1; MG/1
1 TABLET ORAL EVERY 4 HOURS PRN
Status: DISCONTINUED | OUTPATIENT
Start: 2023-01-04 | End: 2023-01-04 | Stop reason: HOSPADM

## 2023-01-04 RX ORDER — SODIUM CHLORIDE 0.9 % (FLUSH) 0.9 %
3 SYRINGE (ML) INJECTION
Status: DISCONTINUED | OUTPATIENT
Start: 2023-01-04 | End: 2023-01-04 | Stop reason: HOSPADM

## 2023-01-04 RX ORDER — OXYCODONE AND ACETAMINOPHEN 7.5; 325 MG/1; MG/1
1 TABLET ORAL EVERY 4 HOURS PRN
Qty: 30 TABLET | Refills: 0 | Status: SHIPPED | OUTPATIENT
Start: 2023-01-04 | End: 2023-05-04

## 2023-01-04 RX ORDER — CEFAZOLIN SODIUM 2 G/50ML
2 SOLUTION INTRAVENOUS
Status: DISCONTINUED | OUTPATIENT
Start: 2023-01-04 | End: 2023-01-04 | Stop reason: HOSPADM

## 2023-01-04 RX ORDER — PROPOFOL 10 MG/ML
INJECTION, EMULSION INTRAVENOUS
Status: DISCONTINUED | OUTPATIENT
Start: 2023-01-04 | End: 2023-01-04

## 2023-01-04 RX ORDER — FENTANYL CITRATE 50 UG/ML
INJECTION, SOLUTION INTRAMUSCULAR; INTRAVENOUS
Status: DISCONTINUED | OUTPATIENT
Start: 2023-01-04 | End: 2023-01-04

## 2023-01-04 RX ADMIN — FENTANYL CITRATE 50 MCG: 50 INJECTION, SOLUTION INTRAMUSCULAR; INTRAVENOUS at 12:01

## 2023-01-04 RX ADMIN — FENTANYL CITRATE 50 MCG: 50 INJECTION, SOLUTION INTRAMUSCULAR; INTRAVENOUS at 01:01

## 2023-01-04 RX ADMIN — SODIUM CHLORIDE, SODIUM LACTATE, POTASSIUM CHLORIDE, AND CALCIUM CHLORIDE: .6; .31; .03; .02 INJECTION, SOLUTION INTRAVENOUS at 11:01

## 2023-01-04 RX ADMIN — MIDAZOLAM HYDROCHLORIDE 2 MG: 1 INJECTION, SOLUTION INTRAMUSCULAR; INTRAVENOUS at 11:01

## 2023-01-04 RX ADMIN — PROPOFOL 50 MG: 10 INJECTION, EMULSION INTRAVENOUS at 11:01

## 2023-01-04 RX ADMIN — LIDOCAINE HYDROCHLORIDE 75 MG: 20 INJECTION, SOLUTION INTRAVENOUS at 11:01

## 2023-01-04 RX ADMIN — CEFAZOLIN 2 G: 330 INJECTION, POWDER, FOR SOLUTION INTRAMUSCULAR; INTRAVENOUS at 12:01

## 2023-01-04 RX ADMIN — PROPOFOL 150 MCG/KG/MIN: 10 INJECTION, EMULSION INTRAVENOUS at 12:01

## 2023-01-04 NOTE — DISCHARGE INSTRUCTIONS
Discharge Instructions for Foot Surgery  Arrange to have an adult drive you home after surgery. If you had general anesthesia, it may take a day or more to fully recover. So, for at least the next 24 hours: Do not drive or use machinery or power tools; do not drink alcohol; and do not make any major decisions.  Diet  Here are some dietary suggestions following surgery:   Start with liquids and light foods (like dry toast, bananas, and applesauce). As you feel up to it, slowly return to your normal diet.  Drink at least 6 to 8 glasses of water or other nonalcoholic fluids a day.  To avoid nausea, eat before taking narcotic pain medicines.  Medicines  It is important to follow these directions:   Take all medicines as instructed.  Take pain medicines on time. Do not wait until the pain is bad before taking your medicines.  Avoid alcohol while on pain medicines.  Activity  These instructions are to help with your recovery:   Sit or lie down when possible. Put a pillow under your heel to raise your foot above the level of your heart.  Wrap an ice pack or bag of frozen peas in a thin cloth. Place it over your bandaged foot for no longer than 20 minutes. Do this three times a day.  You can drive again as instructed by your healthcare provider.  Wear your surgical shoe at all times unless told otherwise by your healthcare provider.  Use crutches or walker as directed.  Follow your healthcare providers instructions about putting weight on your foot.As tolerated with orthopedic boot and walker or crutches, however limit < 10 minutes per hour x 4 days after surgery  Bandage and cast care  Here are tips to follow:   Do not shower for 48 hours.  When you can shower again, cover the bandage or cast with a plastic bag to keep it dry.  Dont remove your bandage until your healthcare provider tells you to. If your bandage gets wet or dirty, check with your healthcare provider.   What to expect  It is normal to have the  following:  Bruising and slight swelling of the foot and toes  A small amount of blood on the dressing  Call your healthcare provider   Contact your healthcare provider right away if you have any of the following:   Continuous bleeding through the bandage  Excessive swelling, increased bleeding, or redness  Fever over 100.4°F (38°C) or chills  Pain unrelieved by pain medicines  Foot feels cold to the touch or numb  Increased ache in your leg or foot  Chest pain or shortness of breath  Anything unusual that concerns you   Date Last Reviewed: 9/26/2015 © 2000-2017 [employer]. [address] [place] [address]. All rights reserved. This information is not intended as a substitute for professional medical care. Always follow your healthcare professional's instructions.

## 2023-01-04 NOTE — TRANSFER OF CARE
"Anesthesia Transfer of Care Note    Patient: Isi Herrera    Procedure(s) Performed: Procedure(s) (LRB):  FUSION, MTP JOINT (Left)  BUNIONECTOMY (Left)    Patient location: GI    Anesthesia Type: MAC    Transport from OR: Transported from OR on room air with adequate spontaneous ventilation    Post pain: adequate analgesia    Post assessment: no apparent anesthetic complications    Post vital signs: stable    Level of consciousness: awake and alert    Nausea/Vomiting: no nausea/vomiting    Complications: none    Transfer of care protocol was followed      Last vitals:   Visit Vitals  /72 (BP Location: Right arm, Patient Position: Sitting)   Pulse 72   Temp 37.2 °C (99 °F) (Temporal)   Resp 16   Ht 5' 7" (1.702 m)   Wt 116.1 kg (256 lb)   LMP  (LMP Unknown)   SpO2 95%   Breastfeeding No   BMI 40.10 kg/m²     "

## 2023-01-04 NOTE — PLAN OF CARE
Patient oob and dressed, vss, pain controlled, all instructions given will discharge in WC safely to car

## 2023-01-04 NOTE — BRIEF OP NOTE
[facility] - Surgery (Hospital)  Brief Operative Note    Surgery Date: 1/4/2023     Surgeon(s) and Role:     * Ace Centeno DPM - Primary     * Chino Adames MD - Resident - Assisting        Pre-op Diagnosis:  Hallux limitus, left [M20.5X2]    Post-op Diagnosis:  Post-Op Diagnosis Codes:     * Hallux limitus, left [M20.5X2]    Procedure(s) (LRB):  FUSION, 1 MTP JOINT (Left)      Anesthesia: Local MAC    Operative Findings: per above    Estimated Blood Loss: 1 mL         Specimens:   Specimen (24h ago, onward)      None              Discharge Note    OUTCOME: Patient tolerated treatment/procedure well without complication and is now ready for discharge.    DISPOSITION: Home or Self Care    FINAL DIAGNOSIS:  Hallux limitus, left    FOLLOWUP: In clinic    DISCHARGE INSTRUCTIONS:    Discharge Procedure Orders   Diet general     Keep surgical extremity elevated   Order Comments: Above heart level     Ice to affected area   Order Comments: Apply around left ankle at rest as needed or behind the knee     Call MD for:  temperature >100.4     Call MD for:  persistent nausea and vomiting     Call MD for:  severe uncontrolled pain     Leave dressing on - Keep it clean, dry, and intact until clinic visit   Order Comments: May loosen outer ace wrap if too tight.     Weight bearing restrictions (specify)   Order Comments: As tolerated with orthopedic boot and walker or crutches, however limit < 10 minutes per hour x 4 days after surgery

## 2023-01-04 NOTE — OP NOTE
[facility] ([facility])  Operative Note      Date of Procedure: 1/4/2023     Procedure:   FUSION, 1 MTP JOINT (Left)    Surgeon(s) and Role:     * Ace Centeno DPM - Primary     * Chino Adames DPM - Resident - Assisting        Pre-Operative Diagnosis: Hallux limitus, left [M20.5X2]    Post-Operative Diagnosis: Post-Op Diagnosis Codes:     * Hallux limitus, left [M20.5X2]    Anesthesia: Local MAC    Description of Technical Procedures: The patient was brought to the operating room on a stretcher and was transferred to the OR table in supine position. Anesthesia was induced.  A tourniquet was applied to the left ankle. 20 mL of a 1:1 mixture of 1% lidocaine plain and 0.25% bupivacaine plain was locally infiltrated. The left lower limb was prepped and draped in a sterile manner. Tourniquet(s) inflated to 250 mmHg.     Attention directed to the 1st ray. Using a 15 blade a longitudinal skin incision was made dorsally at distal 1st metatarsal and proximal phalanx medial to the EHL tendon. Sharp and blunt dissection was carried down to the level of periosteum and capsule. Bleeding controlled with 3-0 vicryl and electrocautery. Using 15 blade a longitudinal incision was made through periosteum and 1st MTPJ capsule and connective tissues were sharply elevated medially and laterally to expose bone and 1st MTPJ. Extensive arthrosis of the 1st MTPJ was noted with exostoses/lipping at both metatarsal head and phalangeal base, there was severe cartilage degradation at the metatarsal head. Using a McGlamry elevator the sesamoid apparatus was released. Proximal phalanx was manually plantarflexed, a K wire was driven retrograde into the 1st metatarsal and the 1st metatarsal head was reamed over the guidewire, guidewire removed. A K wire was driven antegrade into the 1st proximal phalanx and the phalangeal base was reamed, guidewire removed. Using a rongeur osteophytic lipping was excised from the head of the  metatarsal and base of the phalanx. Site irrigated with saline via bulb syringe. Surfaces of metatarsal head and phalangeal base were fenestrated with drill. The MTPJ was positioned manually and a guidewire was driven retrograde across the MTPJ, position verified under fluoroscopy. A cannulated lag screw was driven across the MTPJ over the wire, guidewire removed. Site fixated using a dorsal Max Force compression plate system with 5 locking screws and 1 nonlocking compression screw. Positioning verified using fluoroscopy.    The surgical site was irrigated with saline solution via bulb syringe.  Hypertrophic synovium/capsule was excised. Layered primary closure performed using 3-0 vicryl, 4-0 vicryl, 4-0 nylon suture. Another 10 mL of 0.25% bupivacaine plain was locally infiltrated. Dressed with xeroform, 4x4 gauze, cast padding, ACE wrap. Tourniquet deflated. Placed in short surgical boot.       The patient was transferred to the recovery room with vital signs stable. Following a period of post op monitoring, the patient will be discharged home with the following postop instructions:   1. Keep dressing clean, dry, and intact until next seen by podiatry.   2. Weightbearing status: partial weightbearing.   3. All necessary prescriptions were ordered and medical management will continue.   4. Contact podiatry with any postop questions or concerns.       Estimated Blood Loss (EBL): 1 mL           Implants:   Implant Name Type Inv. Item Serial No.  Lot No. LRB No. Used Action   WIRE K SMALL BALL BB-KRYSTIAN - [id number]  WIRE K SMALL BALL BB-KRYSTIAN  [employer]  Right 2 Implanted and Explanted   1.6 guidewire    [employer]  Right 2 Implanted and Explanted   GUIDEWIRE DE TROCAR .045IN - [id number]  GUIDEWIRE DE TROCAR .045IN  [employer]  Right 1 Implanted and Explanted   SCREW COMPR FT 3.5 MINI 34MM - [id number]  SCREW COMPR FT 3.5 MINI 34MM  ARTHREX  Right 1 Implanted   0 valgus 0 dorsiflex, left       Right 1 Implanted    SCREW LP VA 3X14MM NIKKI TI - [id number]  SCREW LP VA 3X14MM NIKKI TI  [employer]  Right 1 Implanted   KreuLock Compression Screws, 3.0, MEERA, Ti - 16mm      Left 2 Implanted   KreuLock Compression Screw, MEERA, TI, 3.0 - 18      Left 1 Implanted   KreuLock Compression Screw, MEERA, TI, 3.0 - 20      Left 1 Implanted   3mm Flat Head Screws, Cortical - 20mm      Left 1 Implanted       Specimens:   Specimen (24h ago, onward)      None                    Condition: Good    Disposition: PACU - hemodynamically stable.    Attestation: I was present and scrubbed for the entire procedure.  I directly supervised the above resident and was scrubbed for the entire surgical case.  Ace Centeno DPM, FACFAS

## 2023-01-04 NOTE — PLAN OF CARE
Accu check performed using blood from IV insertion with a result of 44.  Accu check redone via finger stick with a result of 96.  No signs or symptoms of hypoglycemia reported by patient.

## 2023-01-04 NOTE — H&P
"Subjective:      Patient ID: Isi Herrera is a 47 y.o. female.    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  She was previously scheduled for surgical intervention a couple years ago however had to cancel secondary to COVID 19.  Relates pain has worsened and she is unable to find any relief.  She feels that she has failed conservative care at this time.  History of 1st metatarsophalangeal joint arthrodesis on the right foot.  No symptoms to the right foot today.  History smoking greater than 10 years ago.    01/04/23: Presents for surgical intervention left foot. No new complaints.    Vitals:    11/21/22 1424   BP: 127/72   Pulse: 92   Weight: 122.5 kg (270 lb)   Height: 5' 7" (1.702 m)   PainSc:   6   PainLoc: Foot      Past Medical History:   Diagnosis Date    Thyroid disease     Hypothyroid       Past Surgical History:   Procedure Laterality Date    BUNIONECTOMY Right 2009    FUSION OF METATARSOPHALANGEAL JOINT Right 8/15/2018    Procedure: FUSION, MTP JOINT;  Surgeon: Ace Centeno DPM;  Location: [facility];  Service: Podiatry;  Laterality: Right;  mini c-arm, possible autograft bone harvest from calcaneus, Arthrex locking plate (Clair notified)    HEMORRHOID SURGERY      HYSTERECTOMY      full 2009    OOPHORECTOMY         Family History   Problem Relation Age of Onset    Graves' disease Mother     Arthritis Father     Prostate cancer Father     Diabetes Sister        Social History     Socioeconomic History    Marital status:    Tobacco Use    Smoking status: Never   Substance and Sexual Activity    Alcohol use: No    Drug use: No    Sexual activity: Yes       Current Outpatient Medications   Medication Sig Dispense Refill    dapagliflozin-metformin (XIGDUO XR) 5-1,000 mg Take 1 tablet by mouth every evening.      hydroCHLOROthiazide (MICROZIDE) " 12.5 mg capsule       levothyroxine (SYNTHROID) 75 MCG tablet       lisinopriL-hydrochlorothiazide (PRINZIDE,ZESTORETIC) 10-12.5 mg per tablet Take 1 tablet by mouth once daily.      semaglutide (RYBELSUS ORAL) Take 10 mg by mouth every evening.      SYNTHROID 100 mcg tablet       traZODone (DESYREL) 50 MG tablet       ascorbic acid, vitamin C, (VITAMIN C DROPS) 60 mg Lozg Take by mouth.      calcium carbonate (OS-EDMUNDO) 500 mg calcium (1,250 mg) tablet Take 1 tablet (500 mg total) by mouth once daily. 90 tablet 1    cholecalciferol, vitamin D3, 1,250 mcg (50,000 unit) Tab Take 1,250 mcg by mouth every 7 days. 12 tablet 1    estradiol (ESTRACE) 2 MG tablet       gabapentin (NEURONTIN) 300 MG capsule       ibuprofen (ADVIL,MOTRIN) 800 MG tablet Take 1 tablet (800 mg total) by mouth 3 (three) times daily. 30 tablet 1     Current Facility-Administered Medications   Medication Dose Route Frequency Provider Last Rate Last Admin    sodium chloride 0.9% flush 10 mL  10 mL Intravenous PRN Ace Centeno DPM        sodium chloride 0.9% flush 10 mL  10 mL Intravenous PRN Ace Centeno DPM           Review of patient's allergies indicates:  No Known Allergies        Review of Systems   Constitutional: Negative for chills, fever and malaise/fatigue.   Cardiovascular:  Negative for chest pain, claudication and leg swelling.   Respiratory:  Negative for cough and shortness of breath.    Musculoskeletal:  Negative for back pain, joint pain, muscle cramps and muscle weakness.   Gastrointestinal:  Negative for nausea and vomiting.   Neurological:  Negative for numbness, paresthesias and weakness.   Psychiatric/Behavioral:  Negative for altered mental status.          Objective:      Physical Exam  Constitutional:       General: She is not in acute distress.     Appearance: Normal appearance. She is obese. She is not ill-appearing.   Cardiovascular:      Pulses:           Dorsalis pedis pulses are 2+ on the right side and 2+  on the left side.        Posterior tibial pulses are 2+ on the right side and 2+ on the left side.   Musculoskeletal:      Comments: Large palpable dorsal exostosis 1 MTP left foot.  Localized pain on palpation and edema to the dorsal 1 MTP left foot.  One MTP range of motion with no available dorsiflexion which is painful noting up to 10° of plantar flexion.  Dorsiflexed 1st ray left foot.  Overall rectus appearance to the left foot.   Skin:     General: Skin is warm.      Capillary Refill: Capillary refill takes less than 2 seconds.      Findings: No ecchymosis or erythema.      Nails: There is no clubbing.   Neurological:      Mental Status: She is alert.             Assessment:       Encounter Diagnoses   Name Primary?    Hallux limitus, left Yes    Preop testing          Plan:       Isi was seen today for bunions.    Diagnoses and all orders for this visit:    Hallux limitus, left  -     X-Ray Foot Complete Left; Future  -     Ambulatory referral/consult to Internal Medicine; Future    Preop testing  -     Ambulatory referral/consult to Internal Medicine; Future    Other orders  -     cholecalciferol, vitamin D3, 1,250 mcg (50,000 unit) Tab; Take 1,250 mcg by mouth every 7 days.  -     calcium carbonate (OS-EDMUNDO) 500 mg calcium (1,250 mg) tablet; Take 1 tablet (500 mg total) by mouth once daily.      I counseled the patient on her conditions, their implications and medical management.    Reviewed previous left foot x-ray complete 2 years ago in comparison to the 1 a year ago no ring significant loss of the 1st metatarsophalangeal joint space with large dorsal exostosis and progressive overall arthrosis.  We discussed obtaining another follow-up weight-bearing left foot x-ray.  We discussed continued conservative care versus surgical intervention consisting of 1st metatarsophalangeal joint arthrodesis left foot.  Risks, benefits anterior postop course discussed in detail.  No guarantees were given or implied.   Patient elected to proceed surgical intervention outlined above.      This procedure has been fully reviewed with the patient and written informed consent has been obtained.    Referred to PCP for medical optimization and risk stratification    Surgical intervention scheduled for 01/04/2023 as mac with local anesthetic.  We briefly reviewed weight-bearing restrictions using a short cam boot for up to 8 weeks.    RTC 1 week postop or prn as discussed.    A portion of this note was generated by voice recognition software and may contain spelling and grammar errors.  .   H&P completed on 11/21/22 has been reviewed, the patient has been examined and:  I concur with the findings and no changes have occurred since H&P was written.    There are no hospital problems to display for this patient.

## 2023-01-09 NOTE — ANESTHESIA POSTPROCEDURE EVALUATION
Anesthesia Post Evaluation    Patient: Isi Herrera    Procedure(s) Performed: Procedure(s) (LRB):  FUSION, MTP JOINT (Left)  BUNIONECTOMY (Left)    Final Anesthesia Type: MAC      Patient location during evaluation: PACU  Patient participation: Yes- Able to Participate  Level of consciousness: awake and alert  Post-procedure vital signs: reviewed and stable  Pain management: adequate  Airway patency: patent    PONV status at discharge: No PONV  Anesthetic complications: no      Cardiovascular status: blood pressure returned to baseline  Respiratory status: unassisted  Hydration status: euvolemic  Follow-up not needed.          Vitals Value Taken Time   /78 01/04/23 1438   Temp 36.7 °C (98.1 °F) 01/04/23 1438   Pulse 75 01/04/23 1438   Resp 17 01/04/23 1438   SpO2 97 % 01/04/23 1438         No case tracking events are documented in the log.      Pain/Sallie Score: No data recorded

## 2023-01-12 ENCOUNTER — OFFICE VISIT (OUTPATIENT)
Dept: PODIATRY | Facility: CLINIC | Age: 48
End: 2023-01-12
Payer: COMMERCIAL

## 2023-01-12 VITALS
BODY MASS INDEX: 40.18 KG/M2 | WEIGHT: 256 LBS | HEIGHT: 67 IN | DIASTOLIC BLOOD PRESSURE: 70 MMHG | HEART RATE: 74 BPM | SYSTOLIC BLOOD PRESSURE: 113 MMHG

## 2023-01-12 DIAGNOSIS — Z98.890 POSTOPERATIVE STATE: Primary | ICD-10-CM

## 2023-01-12 PROCEDURE — 3008F PR BODY MASS INDEX (BMI) DOCUMENTED: ICD-10-PCS | Mod: CPTII,S$GLB,, | Performed by: PODIATRIST

## 2023-01-12 PROCEDURE — 3078F DIAST BP <80 MM HG: CPT | Mod: CPTII,S$GLB,, | Performed by: PODIATRIST

## 2023-01-12 PROCEDURE — 3074F SYST BP LT 130 MM HG: CPT | Mod: CPTII,S$GLB,, | Performed by: PODIATRIST

## 2023-01-12 PROCEDURE — 99024 PR POST-OP FOLLOW-UP VISIT: ICD-10-PCS | Mod: S$GLB,,, | Performed by: PODIATRIST

## 2023-01-12 PROCEDURE — 99024 POSTOP FOLLOW-UP VISIT: CPT | Mod: S$GLB,,, | Performed by: PODIATRIST

## 2023-01-12 PROCEDURE — 3008F BODY MASS INDEX DOCD: CPT | Mod: CPTII,S$GLB,, | Performed by: PODIATRIST

## 2023-01-12 PROCEDURE — 1159F MED LIST DOCD IN RCRD: CPT | Mod: CPTII,S$GLB,, | Performed by: PODIATRIST

## 2023-01-12 PROCEDURE — 99999 PR PBB SHADOW E&M-EST. PATIENT-LVL IV: CPT | Mod: PBBFAC,,, | Performed by: PODIATRIST

## 2023-01-12 PROCEDURE — 3074F PR MOST RECENT SYSTOLIC BLOOD PRESSURE < 130 MM HG: ICD-10-PCS | Mod: CPTII,S$GLB,, | Performed by: PODIATRIST

## 2023-01-12 PROCEDURE — 3078F PR MOST RECENT DIASTOLIC BLOOD PRESSURE < 80 MM HG: ICD-10-PCS | Mod: CPTII,S$GLB,, | Performed by: PODIATRIST

## 2023-01-12 PROCEDURE — 99999 PR PBB SHADOW E&M-EST. PATIENT-LVL IV: ICD-10-PCS | Mod: PBBFAC,,, | Performed by: PODIATRIST

## 2023-01-12 PROCEDURE — 1160F RVW MEDS BY RX/DR IN RCRD: CPT | Mod: CPTII,S$GLB,, | Performed by: PODIATRIST

## 2023-01-12 PROCEDURE — 1160F PR REVIEW ALL MEDS BY PRESCRIBER/CLIN PHARMACIST DOCUMENTED: ICD-10-PCS | Mod: CPTII,S$GLB,, | Performed by: PODIATRIST

## 2023-01-12 PROCEDURE — 1159F PR MEDICATION LIST DOCUMENTED IN MEDICAL RECORD: ICD-10-PCS | Mod: CPTII,S$GLB,, | Performed by: PODIATRIST

## 2023-01-12 NOTE — PROGRESS NOTES
"Subjective:      Patient ID: Isi Herrera is a 47 y.o. female.    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  She was previously scheduled for surgical intervention a couple years ago however had to cancel secondary to COVID 19.  Relates pain has worsened and she is unable to find any relief.  She feels that she has failed conservative care at this time.  History of 1st metatarsophalangeal joint arthrodesis on the right foot.  No symptoms to the right foot today.  History smoking greater than 10 years ago.    01/12/2023:  Post 1 MTP arthrodesis left foot on 01/04/2023.  Relates mild pain to left foot.  Denies any slips, trips or falls.  Using a rolling knee scooter.    Vitals:    01/12/23 1023   BP: 113/70   Pulse: 74   Weight: 116.1 kg (256 lb)   Height: 5' 7" (1.702 m)   PainSc:   2   PainLoc: Foot      Past Medical History:   Diagnosis Date    Thyroid disease     Hypothyroid       Past Surgical History:   Procedure Laterality Date    BUNIONECTOMY Right 2009    BUNIONECTOMY Left 1/4/2023    Procedure: BUNIONECTOMY;  Surgeon: Ace Centeno DPM;  Location: [facility] OR;  Service: Podiatry;  Laterality: Left;    FUSION OF METATARSOPHALANGEAL JOINT Right 8/15/2018    Procedure: FUSION, MTP JOINT;  Surgeon: Ace Centeno DPM;  Location: [facility] OR;  Service: Podiatry;  Laterality: Right;  mini c-arm, possible autograft bone harvest from calcaneus, Arthrex locking plate (Clair notified)    FUSION OF METATARSOPHALANGEAL JOINT Left 1/4/2023    Procedure: FUSION, MTP JOINT;  Surgeon: Ace Centeno DPM;  Location: [facility] OR;  Service: Podiatry;  Laterality: Left;  mini c-arm, Arthrex locking plate and screws shital confirmed CW    HEMORRHOID SURGERY      HYSTERECTOMY      full 2009    OOPHORECTOMY         Family History   Problem Relation Age of Onset    Graves' " disease Mother     Arthritis Father     Prostate cancer Father     Diabetes Sister        Social History     Socioeconomic History    Marital status:    Tobacco Use    Smoking status: Never   Substance and Sexual Activity    Alcohol use: No    Drug use: No    Sexual activity: Yes       Current Outpatient Medications   Medication Sig Dispense Refill    calcium carbonate (OS-EDUMNDO) 500 mg calcium (1,250 mg) tablet Take 1 tablet (500 mg total) by mouth once daily. 90 tablet 1    cholecalciferol, vitamin D3, 1,250 mcg (50,000 unit) Tab Take 1,250 mcg by mouth every 7 days. 12 tablet 1    dapagliflozin-metformin (XIGDUO XR) 5-1,000 mg Take 1 tablet by mouth every evening.      erythromycin (ROMYCIN) ophthalmic ointment Place into both eyes.      hydroCHLOROthiazide (MICROZIDE) 12.5 mg capsule       ibuprofen (ADVIL,MOTRIN) 800 MG tablet Take 1 tablet (800 mg total) by mouth every 6 (six) hours as needed for Pain. 30 tablet 1    lisinopriL-hydrochlorothiazide (PRINZIDE,ZESTORETIC) 10-12.5 mg per tablet Take 1 tablet by mouth once daily.      ondansetron (ZOFRAN) 8 MG tablet Take 1 tablet (8 mg total) by mouth every 12 (twelve) hours as needed for Nausea. 3 tablet 0    oxyCODONE-acetaminophen (PERCOCET) 7.5-325 mg per tablet Take 1 tablet by mouth every 4 (four) hours as needed for Pain. 30 tablet 0    SYNTHROID 100 mcg tablet       tirzepatide (MOUNJARO) 2.5 mg/0.5 mL PnIj Inject 2.5 mg into the skin every 7 days.      traZODone (DESYREL) 50 MG tablet       cephALEXin (KEFLEX) 500 MG capsule Take 1 capsule (500 mg total) by mouth 4 (four) times daily. (Patient not taking: Reported on 1/12/2023) 28 capsule 0     Current Facility-Administered Medications   Medication Dose Route Frequency Provider Last Rate Last Admin    sodium chloride 0.9% flush 10 mL  10 mL Intravenous PRN Ace Centeno DPM        sodium chloride 0.9% flush 10 mL  10 mL Intravenous PRN Ace Centeno DPM           Review of patient's  allergies indicates:  No Known Allergies        Review of Systems   Constitutional: Negative for chills, fever and malaise/fatigue.   Cardiovascular:  Negative for chest pain, claudication and leg swelling.   Respiratory:  Negative for cough and shortness of breath.    Musculoskeletal:  Negative for back pain, joint pain, muscle cramps and muscle weakness.   Gastrointestinal:  Negative for nausea and vomiting.   Neurological:  Negative for numbness, paresthesias and weakness.   Psychiatric/Behavioral:  Negative for altered mental status.          Objective:      Physical Exam  Constitutional:       General: She is not in acute distress.     Appearance: Normal appearance. She is obese. She is not ill-appearing.   Cardiovascular:      Pulses:           Dorsalis pedis pulses are 2+ on the right side and 2+ on the left side.        Posterior tibial pulses are 2+ on the right side and 2+ on the left side.   Musculoskeletal:      Comments: Mild localized pain on palpation surgical site dorsal 1 MTP left foot.  Rectus alignment left hallux. No palpable fluctuance or crepitance.  No available 1 MTP range of motion.  Mild localized edema.  No signs infection.   Skin:     General: Skin is warm.      Capillary Refill: Capillary refill takes less than 2 seconds.      Findings: No ecchymosis or erythema.      Nails: There is no clubbing.      Comments: Incision dorsal 1 MTP left foot well-approximated sutures.  No signs infection.  No gapping or drainage observed.  No surrounding erythema.   Neurological:      Mental Status: She is alert.             Assessment:       Encounter Diagnosis   Name Primary?    Postoperative state Yes         Plan:       Isi was seen today for post-op evaluation.    Diagnoses and all orders for this visit:    Postoperative state    I counseled the patient on her conditions, their implications and medical management.    Dressing left foot removed.  Left foot cleansed Hibiclens scrub.  Applied  Mepilex Ag border followed by Tubigrip F.  Home care instructions reviewed in detail.  Patient may shower as discussed.  Avoid submerging foot directly into water.      Weight-bearing as tolerated with orthopedic boot left foot.  Continue elevation at rest.      RTC within 2 weeks for suture removal or p.r.n. as discussed    Assisted by Chino Adames DPM PGY 3    A portion of this note was generated by voice recognition software and may contain spelling and grammar errors.  .

## 2023-01-23 ENCOUNTER — TELEPHONE (OUTPATIENT)
Dept: PODIATRY | Facility: CLINIC | Age: 48
End: 2023-01-23
Payer: COMMERCIAL

## 2023-01-23 RX ORDER — GABAPENTIN 300 MG/1
300 CAPSULE ORAL 3 TIMES DAILY
Qty: 30 CAPSULE | Refills: 1 | Status: SHIPPED | OUTPATIENT
Start: 2023-01-23

## 2023-01-23 NOTE — TELEPHONE ENCOUNTER
Called patient and she related that 3 days ago she began to get intermittent cramping pain overlying the dorsal left foot involving mostly the surgical site which would last for brief intervals of time up to 5 seconds or so.  As of today the pain has increased and is frequent.  She is had to take pain medication to help reduce the discomfort.  We discussed taking gabapentin 300 mg p.o. by gradually titrating the dose initially q.h.s. x2 days followed by b.i.d. x2 days and t.i.d. after 2 more days.  She is to continue increasing as tolerated.  We also discussed that she may be having an electrolyte abnormality secondary to dehydration and have recommended over-the-counter supplement with potassium and magnesium.  Patient to notify me tomorrow via my chart on her progress.

## 2023-01-24 ENCOUNTER — TELEPHONE (OUTPATIENT)
Dept: PODIATRY | Facility: CLINIC | Age: 48
End: 2023-01-24
Payer: COMMERCIAL

## 2023-01-24 NOTE — TELEPHONE ENCOUNTER
"Spoke with Ms Wilson yesterday in regards to her pain that feels like a "fadia horse." Informed her that I would inform Dr Centeno and see what he advises. Pt verbalized understanding. Encouraged to call if further assistance is needed. Spoke with Dr. Centeno directly, so that he was aware.  "

## 2023-01-24 NOTE — TELEPHONE ENCOUNTER
----- Message from Naty Jimenez sent at 1/23/2023  2:43 PM CST -----  Needs advice from nurse:      Who Called:pt  Regarding:patient is in a lot of pain since surgery/meds aren't helping/some spasming as well  Would the patient rather a call back or [facility]IA [facility]?  Best Call Back number:[phone]  Additional Info:

## 2023-01-26 ENCOUNTER — OFFICE VISIT (OUTPATIENT)
Dept: PODIATRY | Facility: CLINIC | Age: 48
End: 2023-01-26
Payer: COMMERCIAL

## 2023-01-26 VITALS
DIASTOLIC BLOOD PRESSURE: 89 MMHG | HEART RATE: 85 BPM | HEIGHT: 67 IN | WEIGHT: 256 LBS | SYSTOLIC BLOOD PRESSURE: 141 MMHG | BODY MASS INDEX: 40.18 KG/M2

## 2023-01-26 DIAGNOSIS — Z98.890 POSTOPERATIVE STATE: Primary | ICD-10-CM

## 2023-01-26 PROCEDURE — 1159F MED LIST DOCD IN RCRD: CPT | Mod: CPTII,S$GLB,, | Performed by: PODIATRIST

## 2023-01-26 PROCEDURE — 3077F PR MOST RECENT SYSTOLIC BLOOD PRESSURE >= 140 MM HG: ICD-10-PCS | Mod: CPTII,S$GLB,, | Performed by: PODIATRIST

## 2023-01-26 PROCEDURE — 3079F DIAST BP 80-89 MM HG: CPT | Mod: CPTII,S$GLB,, | Performed by: PODIATRIST

## 2023-01-26 PROCEDURE — 3077F SYST BP >= 140 MM HG: CPT | Mod: CPTII,S$GLB,, | Performed by: PODIATRIST

## 2023-01-26 PROCEDURE — 99024 POSTOP FOLLOW-UP VISIT: CPT | Mod: S$GLB,,, | Performed by: PODIATRIST

## 2023-01-26 PROCEDURE — 1160F PR REVIEW ALL MEDS BY PRESCRIBER/CLIN PHARMACIST DOCUMENTED: ICD-10-PCS | Mod: CPTII,S$GLB,, | Performed by: PODIATRIST

## 2023-01-26 PROCEDURE — 3079F PR MOST RECENT DIASTOLIC BLOOD PRESSURE 80-89 MM HG: ICD-10-PCS | Mod: CPTII,S$GLB,, | Performed by: PODIATRIST

## 2023-01-26 PROCEDURE — 1160F RVW MEDS BY RX/DR IN RCRD: CPT | Mod: CPTII,S$GLB,, | Performed by: PODIATRIST

## 2023-01-26 PROCEDURE — 99999 PR PBB SHADOW E&M-EST. PATIENT-LVL IV: CPT | Mod: PBBFAC,,, | Performed by: PODIATRIST

## 2023-01-26 PROCEDURE — 99999 PR PBB SHADOW E&M-EST. PATIENT-LVL IV: ICD-10-PCS | Mod: PBBFAC,,, | Performed by: PODIATRIST

## 2023-01-26 PROCEDURE — 3008F PR BODY MASS INDEX (BMI) DOCUMENTED: ICD-10-PCS | Mod: CPTII,S$GLB,, | Performed by: PODIATRIST

## 2023-01-26 PROCEDURE — 1159F PR MEDICATION LIST DOCUMENTED IN MEDICAL RECORD: ICD-10-PCS | Mod: CPTII,S$GLB,, | Performed by: PODIATRIST

## 2023-01-26 PROCEDURE — 3008F BODY MASS INDEX DOCD: CPT | Mod: CPTII,S$GLB,, | Performed by: PODIATRIST

## 2023-01-26 PROCEDURE — 99024 PR POST-OP FOLLOW-UP VISIT: ICD-10-PCS | Mod: S$GLB,,, | Performed by: PODIATRIST

## 2023-01-26 NOTE — PROGRESS NOTES
"Subjective:      Patient ID: Isi Herrera is a 47 y.o. female.    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  She was previously scheduled for surgical intervention a couple years ago however had to cancel secondary to COVID 19.  Relates pain has worsened and she is unable to find any relief.  She feels that she has failed conservative care at this time.  History of 1st metatarsophalangeal joint arthrodesis on the right foot.  No symptoms to the right foot today.  History smoking greater than 10 years ago.    01/12/2023:  Post 1 MTP arthrodesis left foot on 01/04/2023.  Relates mild pain to left foot.  Denies any slips, trips or falls.  Using a rolling knee scooter.    01/26/2023:3 weeks postop.  Taking gabapentin 300 milligrams p.o. b.i.d. which is helping with her pain and taking magnesium.  Ambulating as tolerated with short Cam boot and using a rolling knee scooter.  Set to return to work next week.    Vitals:    01/26/23 0923   BP: (!) 141/89   Pulse: 85   Weight: 116.1 kg (256 lb)   Height: 5' 7" (1.702 m)   PainSc:   2      Past Medical History:   Diagnosis Date    Thyroid disease     Hypothyroid       Past Surgical History:   Procedure Laterality Date    BUNIONECTOMY Right 2009    BUNIONECTOMY Left 1/4/2023    Procedure: BUNIONECTOMY;  Surgeon: Ace Centeno DPM;  Location: [facility] OR;  Service: Podiatry;  Laterality: Left;    FUSION OF METATARSOPHALANGEAL JOINT Right 8/15/2018    Procedure: FUSION, MTP JOINT;  Surgeon: Ace Centeno DPM;  Location: [facility] OR;  Service: Podiatry;  Laterality: Right;  mini c-arm, possible autograft bone harvest from calcaneus, Arthrex locking plate (Clair notified)    FUSION OF METATARSOPHALANGEAL JOINT Left 1/4/2023    Procedure: FUSION, MTP JOINT;  Surgeon: Ace Centeno DPM;  Location: [facility] OR;  Service: Podiatry;  " Laterality: Left;  mini c-arm, Arthrex locking plate and screws shital confirmed CW    HEMORRHOID SURGERY      HYSTERECTOMY      full 2009    OOPHORECTOMY         Family History   Problem Relation Age of Onset    Graves' disease Mother     Arthritis Father     Prostate cancer Father     Diabetes Sister        Social History     Socioeconomic History    Marital status:    Tobacco Use    Smoking status: Never   Substance and Sexual Activity    Alcohol use: No    Drug use: No    Sexual activity: Yes       Current Outpatient Medications   Medication Sig Dispense Refill    calcium carbonate (OS-EDMUNDO) 500 mg calcium (1,250 mg) tablet Take 1 tablet (500 mg total) by mouth once daily. 90 tablet 1    cephALEXin (KEFLEX) 500 MG capsule Take 1 capsule (500 mg total) by mouth 4 (four) times daily. 28 capsule 0    cholecalciferol, vitamin D3, 1,250 mcg (50,000 unit) Tab Take 1,250 mcg by mouth every 7 days. 12 tablet 1    dapagliflozin-metformin (XIGDUO XR) 5-1,000 mg Take 1 tablet by mouth every evening.      erythromycin (ROMYCIN) ophthalmic ointment Place into both eyes.      gabapentin (NEURONTIN) 300 MG capsule Take 1 capsule (300 mg total) by mouth 3 (three) times daily. 30 capsule 1    hydroCHLOROthiazide (MICROZIDE) 12.5 mg capsule       ibuprofen (ADVIL,MOTRIN) 800 MG tablet Take 1 tablet (800 mg total) by mouth every 6 (six) hours as needed for Pain. 30 tablet 1    lisinopriL-hydrochlorothiazide (PRINZIDE,ZESTORETIC) 10-12.5 mg per tablet Take 1 tablet by mouth once daily.      ondansetron (ZOFRAN) 8 MG tablet Take 1 tablet (8 mg total) by mouth every 12 (twelve) hours as needed for Nausea. 3 tablet 0    oxyCODONE-acetaminophen (PERCOCET) 7.5-325 mg per tablet Take 1 tablet by mouth every 4 (four) hours as needed for Pain. 30 tablet 0    SYNTHROID 100 mcg tablet       tirzepatide (MOUNJARO) 2.5 mg/0.5 mL PnIj Inject 2.5 mg into the skin every 7 days.      traZODone (DESYREL) 50 MG tablet        Current  Facility-Administered Medications   Medication Dose Route Frequency Provider Last Rate Last Admin    sodium chloride 0.9% flush 10 mL  10 mL Intravenous PRN Ace Centeno DPM        sodium chloride 0.9% flush 10 mL  10 mL Intravenous PRN Ace Centeno DPM           Review of patient's allergies indicates:  No Known Allergies        Review of Systems   Constitutional: Negative for chills, fever and malaise/fatigue.   Cardiovascular:  Negative for chest pain, claudication and leg swelling.   Respiratory:  Negative for cough and shortness of breath.    Musculoskeletal:  Negative for back pain, joint pain, muscle cramps and muscle weakness.   Gastrointestinal:  Negative for nausea and vomiting.   Neurological:  Negative for numbness, paresthesias and weakness.   Psychiatric/Behavioral:  Negative for altered mental status.          Objective:      Physical Exam  Constitutional:       General: She is not in acute distress.     Appearance: Normal appearance. She is obese. She is not ill-appearing.   Cardiovascular:      Pulses:           Dorsalis pedis pulses are 2+ on the right side and 2+ on the left side.        Posterior tibial pulses are 2+ on the right side and 2+ on the left side.   Musculoskeletal:      Comments: No pain on palpation surgical site dorsal 1 MTP left foot.  Rectus alignment left hallux. No palpable fluctuance or crepitance.  No available 1 MTP range of motion.  Mild localized edema.  No signs infection.   Skin:     General: Skin is warm.      Capillary Refill: Capillary refill takes less than 2 seconds.      Findings: No ecchymosis or erythema.      Nails: There is no clubbing.      Comments: Incision dorsal 1 MTP left foot well-approximated sutures and healed.  No signs infection.     Neurological:      Mental Status: She is alert.             Assessment:       Encounter Diagnosis   Name Primary?    Postoperative state Yes         Plan:       Isi was seen today for post-op  evaluation.    Diagnoses and all orders for this visit:    Postoperative state  -     X-Ray Foot Complete Left; Future    I counseled the patient on her conditions, their implications and medical management.    Sutures removed.  No dehiscence or gapping noted.  Reviewed home care instructions.      Continue ambulating as tolerated with short Cam boot.  Continue elevation at rest.      Patient to avoid ascending stairs for an additional 3 weeks as discussed.    RTC within 5 weeks for follow-up weight-bearing x-ray.  At that time if sufficient healing begin transitioning to a tennis shoe.    Assisted by Chino Adames DPM PGY 3    A portion of this note was generated by voice recognition software and may contain spelling and grammar errors.  .

## 2023-03-02 ENCOUNTER — HOSPITAL ENCOUNTER (OUTPATIENT)
Dept: RADIOLOGY | Facility: HOSPITAL | Age: 48
Discharge: HOME | End: 2023-03-02
Attending: PODIATRIST
Payer: COMMERCIAL

## 2023-03-02 ENCOUNTER — OFFICE VISIT (OUTPATIENT)
Dept: PODIATRY | Facility: CLINIC | Age: 48
End: 2023-03-02
Payer: COMMERCIAL

## 2023-03-02 VITALS
HEIGHT: 67 IN | SYSTOLIC BLOOD PRESSURE: 119 MMHG | WEIGHT: 256 LBS | DIASTOLIC BLOOD PRESSURE: 76 MMHG | HEART RATE: 80 BPM | BODY MASS INDEX: 40.18 KG/M2

## 2023-03-02 DIAGNOSIS — Z98.890 POSTOPERATIVE STATE: Primary | ICD-10-CM

## 2023-03-02 DIAGNOSIS — Z98.890 POSTOPERATIVE STATE: ICD-10-CM

## 2023-03-02 PROCEDURE — 99999 PR PBB SHADOW E&M-EST. PATIENT-LVL IV: CPT | Mod: PBBFAC,,, | Performed by: PODIATRIST

## 2023-03-02 PROCEDURE — 99024 PR POST-OP FOLLOW-UP VISIT: ICD-10-PCS | Mod: S$GLB,,, | Performed by: PODIATRIST

## 2023-03-02 PROCEDURE — 99999 PR PBB SHADOW E&M-EST. PATIENT-LVL IV: ICD-10-PCS | Mod: PBBFAC,,, | Performed by: PODIATRIST

## 2023-03-02 PROCEDURE — 1160F RVW MEDS BY RX/DR IN RCRD: CPT | Mod: CPTII,S$GLB,, | Performed by: PODIATRIST

## 2023-03-02 PROCEDURE — 3074F SYST BP LT 130 MM HG: CPT | Mod: CPTII,S$GLB,, | Performed by: PODIATRIST

## 2023-03-02 PROCEDURE — 1160F PR REVIEW ALL MEDS BY PRESCRIBER/CLIN PHARMACIST DOCUMENTED: ICD-10-PCS | Mod: CPTII,S$GLB,, | Performed by: PODIATRIST

## 2023-03-02 PROCEDURE — 3078F PR MOST RECENT DIASTOLIC BLOOD PRESSURE < 80 MM HG: ICD-10-PCS | Mod: CPTII,S$GLB,, | Performed by: PODIATRIST

## 2023-03-02 PROCEDURE — 73630 X-RAY EXAM OF FOOT: CPT | Mod: TC,PN,LT

## 2023-03-02 PROCEDURE — 73630 X-RAY EXAM OF FOOT: CPT | Mod: 26,LT,, | Performed by: RADIOLOGY

## 2023-03-02 PROCEDURE — 3008F BODY MASS INDEX DOCD: CPT | Mod: CPTII,S$GLB,, | Performed by: PODIATRIST

## 2023-03-02 PROCEDURE — 3074F PR MOST RECENT SYSTOLIC BLOOD PRESSURE < 130 MM HG: ICD-10-PCS | Mod: CPTII,S$GLB,, | Performed by: PODIATRIST

## 2023-03-02 PROCEDURE — 73630 XR FOOT COMPLETE 3 VIEW LEFT: ICD-10-PCS | Mod: 26,LT,, | Performed by: RADIOLOGY

## 2023-03-02 PROCEDURE — 3078F DIAST BP <80 MM HG: CPT | Mod: CPTII,S$GLB,, | Performed by: PODIATRIST

## 2023-03-02 PROCEDURE — 99024 POSTOP FOLLOW-UP VISIT: CPT | Mod: S$GLB,,, | Performed by: PODIATRIST

## 2023-03-02 PROCEDURE — 1159F MED LIST DOCD IN RCRD: CPT | Mod: CPTII,S$GLB,, | Performed by: PODIATRIST

## 2023-03-02 PROCEDURE — 1159F PR MEDICATION LIST DOCUMENTED IN MEDICAL RECORD: ICD-10-PCS | Mod: CPTII,S$GLB,, | Performed by: PODIATRIST

## 2023-03-02 PROCEDURE — 3008F PR BODY MASS INDEX (BMI) DOCUMENTED: ICD-10-PCS | Mod: CPTII,S$GLB,, | Performed by: PODIATRIST

## 2023-03-02 NOTE — PROGRESS NOTES
"Subjective:      Patient ID: Isi Herrera is a 47 y.o. female.    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  She was previously scheduled for surgical intervention a couple years ago however had to cancel secondary to COVID 19.  Relates pain has worsened and she is unable to find any relief.  She feels that she has failed conservative care at this time.  History of 1st metatarsophalangeal joint arthrodesis on the right foot.  No symptoms to the right foot today.  History smoking greater than 10 years ago.    01/12/2023:  Post 1 MTP arthrodesis left foot on 01/04/2023.  Relates mild pain to left foot.  Denies any slips, trips or falls.  Using a rolling knee scooter.    01/26/2023:3 weeks postop.  Taking gabapentin 300 milligrams p.o. b.i.d. which is helping with her pain and taking magnesium.  Ambulating as tolerated with short Cam boot and using a rolling knee scooter.  Set to return to work next week.    03/02/2023:  2 months postop.  No pain reported.  Ambulating with cam boot.    Vitals:    03/02/23 0835   BP: 119/76   Pulse: 80   Weight: 116.1 kg (256 lb)   Height: 5' 7" (1.702 m)   PainSc: 0-No pain      Past Medical History:   Diagnosis Date    Thyroid disease     Hypothyroid       Past Surgical History:   Procedure Laterality Date    BUNIONECTOMY Right 2009    BUNIONECTOMY Left 1/4/2023    Procedure: BUNIONECTOMY;  Surgeon: Ace Centeno DPM;  Location: [facility] OR;  Service: Podiatry;  Laterality: Left;    FUSION OF METATARSOPHALANGEAL JOINT Right 8/15/2018    Procedure: FUSION, MTP JOINT;  Surgeon: Ace Centeno DPM;  Location: [facility] OR;  Service: Podiatry;  Laterality: Right;  mini c-arm, possible autograft bone harvest from calcaneus, Arthrex locking plate (Clair notified)    FUSION OF METATARSOPHALANGEAL JOINT Left 1/4/2023    Procedure: FUSION, " MTP JOINT;  Surgeon: Ace Centeno DPM;  Location: [facility] OR;  Service: Podiatry;  Laterality: Left;  mini c-arm, Arthrex locking plate and screws shital confirmed CW    HEMORRHOID SURGERY      HYSTERECTOMY      full 2009    OOPHORECTOMY         Family History   Problem Relation Age of Onset    Graves' disease Mother     Arthritis Father     Prostate cancer Father     Diabetes Sister        Social History     Socioeconomic History    Marital status:    Tobacco Use    Smoking status: Never   Substance and Sexual Activity    Alcohol use: No    Drug use: No    Sexual activity: Yes       Current Outpatient Medications   Medication Sig Dispense Refill    calcium carbonate (OS-EDMUNDO) 500 mg calcium (1,250 mg) tablet Take 1 tablet (500 mg total) by mouth once daily. 90 tablet 1    cholecalciferol, vitamin D3, 1,250 mcg (50,000 unit) Tab Take 1,250 mcg by mouth every 7 days. 12 tablet 1    dapagliflozin-metformin (XIGDUO XR) 5-1,000 mg Take 1 tablet by mouth every evening.      erythromycin (ROMYCIN) ophthalmic ointment Place into both eyes.      gabapentin (NEURONTIN) 300 MG capsule Take 1 capsule (300 mg total) by mouth 3 (three) times daily. 30 capsule 1    hydroCHLOROthiazide (MICROZIDE) 12.5 mg capsule       ibuprofen (ADVIL,MOTRIN) 800 MG tablet Take 1 tablet (800 mg total) by mouth every 6 (six) hours as needed for Pain. 30 tablet 1    lisinopriL-hydrochlorothiazide (PRINZIDE,ZESTORETIC) 10-12.5 mg per tablet Take 1 tablet by mouth once daily.      ondansetron (ZOFRAN) 8 MG tablet Take 1 tablet (8 mg total) by mouth every 12 (twelve) hours as needed for Nausea. 3 tablet 0    SYNTHROID 100 mcg tablet       tirzepatide (MOUNJARO) 2.5 mg/0.5 mL PnIj Inject 2.5 mg into the skin every 7 days.      traZODone (DESYREL) 50 MG tablet       cephALEXin (KEFLEX) 500 MG capsule Take 1 capsule (500 mg total) by mouth 4 (four) times daily. (Patient not taking: Reported on 3/2/2023) 28 capsule 0    oxyCODONE-acetaminophen  (PERCOCET) 7.5-325 mg per tablet Take 1 tablet by mouth every 4 (four) hours as needed for Pain. (Patient not taking: Reported on 3/2/2023) 30 tablet 0     Current Facility-Administered Medications   Medication Dose Route Frequency Provider Last Rate Last Admin    sodium chloride 0.9% flush 10 mL  10 mL Intravenous PRN Ace Centeno DPM        sodium chloride 0.9% flush 10 mL  10 mL Intravenous PRN Ace Centeno DPM           Review of patient's allergies indicates:  No Known Allergies        Review of Systems   Constitutional: Negative for chills, fever and malaise/fatigue.   Cardiovascular:  Negative for chest pain, claudication and leg swelling.   Respiratory:  Negative for cough and shortness of breath.    Musculoskeletal:  Negative for back pain, joint pain, muscle cramps and muscle weakness.   Gastrointestinal:  Negative for nausea and vomiting.   Neurological:  Negative for numbness, paresthesias and weakness.   Psychiatric/Behavioral:  Negative for altered mental status.          Objective:      Physical Exam  Constitutional:       General: She is not in acute distress.     Appearance: Normal appearance. She is obese. She is not ill-appearing.   Cardiovascular:      Pulses:           Dorsalis pedis pulses are 2+ on the right side and 2+ on the left side.        Posterior tibial pulses are 2+ on the right side and 2+ on the left side.   Musculoskeletal:      Comments: No pain on palpation surgical site dorsal 1 MTP left foot.  Rectus alignment left hallux. No palpable fluctuance or crepitance.  No available 1 MTP range of motion.  Mild localized edema.  No signs infection.   Skin:     General: Skin is warm.      Capillary Refill: Capillary refill takes less than 2 seconds.      Findings: No ecchymosis or erythema.      Nails: There is no clubbing.      Comments: Normal appearing scar dorsal 1 MTP bilateral foot.   Neurological:      Mental Status: She is alert.             Assessment:        Encounter Diagnosis   Name Primary?    Postoperative state Yes         Plan:       Isi was seen today for post-op evaluation.    Diagnoses and all orders for this visit:    Postoperative state  -     X-Ray Foot Complete Left; Future    I counseled the patient on her conditions, their implications and medical management.    Reviewed left foot weight-bearing x-ray noting normal maintained alignment of the 1 MTP arthrodesis site with intact fixation without signs of hardware failure.  Greater than 70% consolidation of the arthrodesis site.    Patient may begin gradually transitioning out of the cam boot into a tennis shoe 1 hour daily increments as discussed.  We discussed avoiding high impact activity.  She may gradually increase her activity as tolerated.      RTC within 2 months for follow-up weight-bearing x-ray or p.r.n. as discussed.    A portion of this note was generated by voice recognition software and may contain spelling and grammar errors.  .

## 2023-05-04 ENCOUNTER — HOSPITAL ENCOUNTER (OUTPATIENT)
Dept: RADIOLOGY | Facility: HOSPITAL | Age: 48
Discharge: HOME | End: 2023-05-04
Attending: PODIATRIST
Payer: COMMERCIAL

## 2023-05-04 ENCOUNTER — OFFICE VISIT (OUTPATIENT)
Dept: PODIATRY | Facility: CLINIC | Age: 48
End: 2023-05-04
Payer: COMMERCIAL

## 2023-05-04 VITALS
BODY MASS INDEX: 40.18 KG/M2 | HEIGHT: 67 IN | DIASTOLIC BLOOD PRESSURE: 82 MMHG | HEART RATE: 74 BPM | WEIGHT: 256 LBS | SYSTOLIC BLOOD PRESSURE: 124 MMHG

## 2023-05-04 DIAGNOSIS — Z98.890 POSTOPERATIVE STATE: Primary | ICD-10-CM

## 2023-05-04 DIAGNOSIS — Z98.890 POSTOPERATIVE STATE: ICD-10-CM

## 2023-05-04 PROCEDURE — 99999 PR PBB SHADOW E&M-EST. PATIENT-LVL IV: ICD-10-PCS | Mod: PBBFAC,,, | Performed by: PODIATRIST

## 2023-05-04 PROCEDURE — 1160F PR REVIEW ALL MEDS BY PRESCRIBER/CLIN PHARMACIST DOCUMENTED: ICD-10-PCS | Mod: CPTII,S$GLB,, | Performed by: PODIATRIST

## 2023-05-04 PROCEDURE — 3008F PR BODY MASS INDEX (BMI) DOCUMENTED: ICD-10-PCS | Mod: CPTII,S$GLB,, | Performed by: PODIATRIST

## 2023-05-04 PROCEDURE — 1159F PR MEDICATION LIST DOCUMENTED IN MEDICAL RECORD: ICD-10-PCS | Mod: CPTII,S$GLB,, | Performed by: PODIATRIST

## 2023-05-04 PROCEDURE — 1160F RVW MEDS BY RX/DR IN RCRD: CPT | Mod: CPTII,S$GLB,, | Performed by: PODIATRIST

## 2023-05-04 PROCEDURE — 3074F PR MOST RECENT SYSTOLIC BLOOD PRESSURE < 130 MM HG: ICD-10-PCS | Mod: CPTII,S$GLB,, | Performed by: PODIATRIST

## 2023-05-04 PROCEDURE — 4010F PR ACE/ARB THEARPY RXD/TAKEN: ICD-10-PCS | Mod: CPTII,S$GLB,, | Performed by: PODIATRIST

## 2023-05-04 PROCEDURE — 3074F SYST BP LT 130 MM HG: CPT | Mod: CPTII,S$GLB,, | Performed by: PODIATRIST

## 2023-05-04 PROCEDURE — 73630 X-RAY EXAM OF FOOT: CPT | Mod: 26,LT,, | Performed by: RADIOLOGY

## 2023-05-04 PROCEDURE — 99213 PR OFFICE/OUTPT VISIT, EST, LEVL III, 20-29 MIN: ICD-10-PCS | Mod: S$GLB,,, | Performed by: PODIATRIST

## 2023-05-04 PROCEDURE — 3079F DIAST BP 80-89 MM HG: CPT | Mod: CPTII,S$GLB,, | Performed by: PODIATRIST

## 2023-05-04 PROCEDURE — 73630 XR FOOT COMPLETE 3 VIEW LEFT: ICD-10-PCS | Mod: 26,LT,, | Performed by: RADIOLOGY

## 2023-05-04 PROCEDURE — 4010F ACE/ARB THERAPY RXD/TAKEN: CPT | Mod: CPTII,S$GLB,, | Performed by: PODIATRIST

## 2023-05-04 PROCEDURE — 73630 X-RAY EXAM OF FOOT: CPT | Mod: TC,PN,LT

## 2023-05-04 PROCEDURE — 3079F PR MOST RECENT DIASTOLIC BLOOD PRESSURE 80-89 MM HG: ICD-10-PCS | Mod: CPTII,S$GLB,, | Performed by: PODIATRIST

## 2023-05-04 PROCEDURE — 3008F BODY MASS INDEX DOCD: CPT | Mod: CPTII,S$GLB,, | Performed by: PODIATRIST

## 2023-05-04 PROCEDURE — 1159F MED LIST DOCD IN RCRD: CPT | Mod: CPTII,S$GLB,, | Performed by: PODIATRIST

## 2023-05-04 PROCEDURE — 99213 OFFICE O/P EST LOW 20 MIN: CPT | Mod: S$GLB,,, | Performed by: PODIATRIST

## 2023-05-04 PROCEDURE — 99999 PR PBB SHADOW E&M-EST. PATIENT-LVL IV: CPT | Mod: PBBFAC,,, | Performed by: PODIATRIST

## 2023-05-04 NOTE — PROGRESS NOTES
"Subjective:      Patient ID: Isi Herrera is a 47 y.o. female.    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  She was previously scheduled for surgical intervention a couple years ago however had to cancel secondary to COVID 19.  Relates pain has worsened and she is unable to find any relief.  She feels that she has failed conservative care at this time.  History of 1st metatarsophalangeal joint arthrodesis on the right foot.  No symptoms to the right foot today.  History smoking greater than 10 years ago.    01/12/2023:  Post 1 MTP arthrodesis left foot on 01/04/2023.  Relates mild pain to left foot.  Denies any slips, trips or falls.  Using a rolling knee scooter.    01/26/2023:3 weeks postop.  Taking gabapentin 300 milligrams p.o. b.i.d. which is helping with her pain and taking magnesium.  Ambulating as tolerated with short Cam boot and using a rolling knee scooter.  Set to return to work next week.    03/02/2023:  2 months postop.  No pain reported.  Ambulating with cam boot.    05/04/2023:  4 months post 1 MTP arthrodesis left foot.  Relates no pain at all.  She is ambulating normal shoes.  She is been performing her regular activities without any issues.    Vitals:    05/04/23 0849   BP: 124/82   Pulse: 74   Weight: 116.1 kg (256 lb)   Height: 5' 7" (1.702 m)   PainSc: 0-No pain      Past Medical History:   Diagnosis Date    Thyroid disease     Hypothyroid       Past Surgical History:   Procedure Laterality Date    BUNIONECTOMY Right 2009    BUNIONECTOMY Left 1/4/2023    Procedure: BUNIONECTOMY;  Surgeon: Ace Centeno DPM;  Location: [facility] OR;  Service: Podiatry;  Laterality: Left;    FUSION OF METATARSOPHALANGEAL JOINT Right 8/15/2018    Procedure: FUSION, MTP JOINT;  Surgeon: Ace Centeno DPM;  Location: [facility] OR;  Service: Podiatry;  Laterality: Right;  mini c-arm, " possible autograft bone harvest from calcaneus, Arthrex locking plate (Clair notified)    FUSION OF METATARSOPHALANGEAL JOINT Left 1/4/2023    Procedure: FUSION, MTP JOINT;  Surgeon: Ace Centeno DPM;  Location: [facility];  Service: Podiatry;  Laterality: Left;  mini c-arm, Arthrex locking plate and screws shital confirmed CW    HEMORRHOID SURGERY      HYSTERECTOMY      full 2009    OOPHORECTOMY         Family History   Problem Relation Age of Onset    Graves' disease Mother     Arthritis Father     Prostate cancer Father     Diabetes Sister        Social History     Socioeconomic History    Marital status:    Tobacco Use    Smoking status: Never   Substance and Sexual Activity    Alcohol use: No    Drug use: No    Sexual activity: Yes       Current Outpatient Medications   Medication Sig Dispense Refill    dapagliflozin-metformin (XIGDUO XR) 5-1,000 mg Take 1 tablet by mouth every evening.      erythromycin (ROMYCIN) ophthalmic ointment Place into both eyes.      gabapentin (NEURONTIN) 300 MG capsule Take 1 capsule (300 mg total) by mouth 3 (three) times daily. 30 capsule 1    lisinopriL-hydrochlorothiazide (PRINZIDE,ZESTORETIC) 10-12.5 mg per tablet Take 1 tablet by mouth once daily.      SYNTHROID 100 mcg tablet       tirzepatide (MOUNJARO) 2.5 mg/0.5 mL PnIj Inject 2.5 mg into the skin every 7 days.      calcium carbonate (OS-EDMUNDO) 500 mg calcium (1,250 mg) tablet Take 1 tablet (500 mg total) by mouth once daily. 90 tablet 1    cephALEXin (KEFLEX) 500 MG capsule Take 1 capsule (500 mg total) by mouth 4 (four) times daily. (Patient not taking: Reported on 3/2/2023) 28 capsule 0    cholecalciferol, vitamin D3, 1,250 mcg (50,000 unit) Tab Take 1,250 mcg by mouth every 7 days. 12 tablet 1    hydroCHLOROthiazide (MICROZIDE) 12.5 mg capsule       ibuprofen (ADVIL,MOTRIN) 800 MG tablet Take 1 tablet (800 mg total) by mouth every 6 (six) hours as needed for Pain. 30 tablet 1    ondansetron (ZOFRAN) 8 MG  tablet Take 1 tablet (8 mg total) by mouth every 12 (twelve) hours as needed for Nausea. 3 tablet 0    oxyCODONE-acetaminophen (PERCOCET) 7.5-325 mg per tablet Take 1 tablet by mouth every 4 (four) hours as needed for Pain. (Patient not taking: Reported on 3/2/2023) 30 tablet 0    traZODone (DESYREL) 50 MG tablet        Current Facility-Administered Medications   Medication Dose Route Frequency Provider Last Rate Last Admin    sodium chloride 0.9% flush 10 mL  10 mL Intravenous PRN Ace Centeno, CHAYO        sodium chloride 0.9% flush 10 mL  10 mL Intravenous PRN Ace Centeno, CHAYO           Review of patient's allergies indicates:  No Known Allergies        Review of Systems   Constitutional: Negative for chills, fever and malaise/fatigue.   Cardiovascular:  Negative for chest pain, claudication and leg swelling.   Respiratory:  Negative for cough and shortness of breath.    Musculoskeletal:  Negative for back pain, joint pain, muscle cramps and muscle weakness.   Gastrointestinal:  Negative for nausea and vomiting.   Neurological:  Negative for numbness, paresthesias and weakness.   Psychiatric/Behavioral:  Negative for altered mental status.          Objective:      Physical Exam  Constitutional:       General: She is not in acute distress.     Appearance: Normal appearance. She is obese. She is not ill-appearing.   Cardiovascular:      Pulses:           Dorsalis pedis pulses are 2+ on the right side and 2+ on the left side.        Posterior tibial pulses are 2+ on the right side and 2+ on the left side.   Musculoskeletal:      Comments: No pain on palpation surgical site dorsal 1 MTP left foot.  Rectus alignment left hallux. No palpable fluctuance or crepitance.  No available 1 MTP range of motion.  No localized edema.  No signs infection.   Skin:     General: Skin is warm.      Capillary Refill: Capillary refill takes less than 2 seconds.      Findings: No ecchymosis or erythema.      Nails: There is  no clubbing.      Comments: Normal appearing scar dorsal 1 MTP bilateral foot.   Neurological:      Mental Status: She is alert.             Assessment:       Encounter Diagnosis   Name Primary?    Postoperative state Yes         Plan:       Isi was seen today for post-op evaluation.    Diagnoses and all orders for this visit:    Postoperative state      I counseled the patient on her conditions, their implications and medical management.    Reviewed left foot weight-bearing x-ray noting maintained alignment of the 1st metatarsophalangeal joint arthrodesis with intact fixation.  No signs of hardware strain.  The arthrodesis site is completely consolidated.    Continue activity as tolerated.  We reviewed activity restrictions and modifications prevent excessive compensation at the IPJ.      RTC p.r.n. as discussed.    A portion of this note was generated by voice recognition software and may contain spelling and grammar errors.  .

## 2023-10-02 DIAGNOSIS — Z12.31 SCREENING MAMMOGRAM FOR BREAST CANCER: Primary | ICD-10-CM

## 2023-10-09 ENCOUNTER — HOSPITAL ENCOUNTER (OUTPATIENT)
Dept: RADIOLOGY | Facility: HOSPITAL | Age: 48
Discharge: HOME | End: 2023-10-09
Attending: OBSTETRICS & GYNECOLOGY
Payer: COMMERCIAL

## 2023-10-09 DIAGNOSIS — Z12.31 SCREENING MAMMOGRAM FOR BREAST CANCER: ICD-10-CM

## 2023-10-09 PROCEDURE — 77063 MAMMO DIGITAL SCREENING BILAT WITH TOMO: ICD-10-PCS | Mod: 26,,, | Performed by: RADIOLOGY

## 2023-10-09 PROCEDURE — 77067 SCR MAMMO BI INCL CAD: CPT | Mod: TC

## 2023-10-09 PROCEDURE — 77067 MAMMO DIGITAL SCREENING BILAT WITH TOMO: ICD-10-PCS | Mod: 26,,, | Performed by: RADIOLOGY

## 2023-10-09 PROCEDURE — 77067 SCR MAMMO BI INCL CAD: CPT | Mod: 26,,, | Performed by: RADIOLOGY

## 2023-10-09 PROCEDURE — 77063 BREAST TOMOSYNTHESIS BI: CPT | Mod: 26,,, | Performed by: RADIOLOGY

## 2024-11-05 DIAGNOSIS — Z12.31 VISIT FOR SCREENING MAMMOGRAM: Primary | ICD-10-CM

## 2024-11-07 ENCOUNTER — HOSPITAL ENCOUNTER (OUTPATIENT)
Dept: RADIOLOGY | Facility: HOSPITAL | Age: 49
Discharge: HOME | End: 2024-11-07
Attending: OBSTETRICS & GYNECOLOGY
Payer: COMMERCIAL

## 2024-11-07 DIAGNOSIS — Z12.31 VISIT FOR SCREENING MAMMOGRAM: ICD-10-CM

## 2024-11-07 PROCEDURE — 77063 BREAST TOMOSYNTHESIS BI: CPT | Mod: TC

## 2025-06-26 ENCOUNTER — OFFICE VISIT (OUTPATIENT)
Dept: PODIATRY | Facility: CLINIC | Age: 50
End: 2025-06-26
Payer: COMMERCIAL

## 2025-06-26 ENCOUNTER — HOSPITAL ENCOUNTER (OUTPATIENT)
Dept: RADIOLOGY | Facility: HOSPITAL | Age: 50
Discharge: HOME | End: 2025-06-26
Attending: PODIATRIST
Payer: COMMERCIAL

## 2025-06-26 VITALS
HEIGHT: 67 IN | DIASTOLIC BLOOD PRESSURE: 76 MMHG | BODY MASS INDEX: 40.17 KG/M2 | HEART RATE: 75 BPM | SYSTOLIC BLOOD PRESSURE: 128 MMHG | WEIGHT: 255.94 LBS

## 2025-06-26 DIAGNOSIS — G57.62 MORTON'S NEUROMA, LEFT: ICD-10-CM

## 2025-06-26 DIAGNOSIS — M79.672 BILATERAL FOOT PAIN: ICD-10-CM

## 2025-06-26 DIAGNOSIS — M79.671 BILATERAL FOOT PAIN: Primary | ICD-10-CM

## 2025-06-26 DIAGNOSIS — M79.671 BILATERAL FOOT PAIN: ICD-10-CM

## 2025-06-26 DIAGNOSIS — Z98.890 HISTORY OF FOOT SURGERY: ICD-10-CM

## 2025-06-26 DIAGNOSIS — M79.672 BILATERAL FOOT PAIN: Primary | ICD-10-CM

## 2025-06-26 PROCEDURE — 73630 X-RAY EXAM OF FOOT: CPT | Mod: 26,,, | Performed by: STUDENT IN AN ORGANIZED HEALTH CARE EDUCATION/TRAINING PROGRAM

## 2025-06-26 PROCEDURE — 99999 PR PBB SHADOW E&M-EST. PATIENT-LVL III: CPT | Mod: PBBFAC,,, | Performed by: PODIATRIST

## 2025-06-26 PROCEDURE — 73630 X-RAY EXAM OF FOOT: CPT | Mod: TC,50,FY

## 2025-06-26 RX ORDER — METHYLPREDNISOLONE ACETATE 40 MG/ML
40 INJECTION, SUSPENSION INTRA-ARTICULAR; INTRALESIONAL; INTRAMUSCULAR; SOFT TISSUE
Status: COMPLETED | OUTPATIENT
Start: 2025-06-26 | End: 2025-06-26

## 2025-06-26 RX ADMIN — METHYLPREDNISOLONE ACETATE 40 MG: 40 INJECTION, SUSPENSION INTRA-ARTICULAR; INTRALESIONAL; INTRAMUSCULAR; SOFT TISSUE at 12:06

## 2025-06-26 NOTE — PROGRESS NOTES
"Subjective:     Patient ID: Isi Herrera is a 49 y.o. female.    Chief Complaint: Foot Pain (bilateral)    Presents complaining of bilateral foot pain that began within the past couple months.  She has a history of prediabetes in his taking Mounjaro as well as a combination drug with metformin.  She relates to some sensitivity as well as burning and tingling to the left foot.  She has some point tenderness pointing to the region of the arch of the right foot.  She is wearing flip-flops.  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  Denies any slips, trips or falls.  History of bilateral 1 MTP arthrodesis.    Vitals:    06/26/25 1115   BP: 128/76   Pulse: 75   Weight: 116.1 kg (255 lb 15.3 oz)   Height: 5' 7" (1.702 m)   PainSc:   2      Past Medical History:   Diagnosis Date    Thyroid disease     Hypothyroid       Past Surgical History:   Procedure Laterality Date    BUNIONECTOMY Right 2009    BUNIONECTOMY Left 1/4/2023    Procedure: BUNIONECTOMY;  Surgeon: Ace Centeno DPM;  Location: [facility] OR;  Service: Podiatry;  Laterality: Left;    FUSION OF METATARSOPHALANGEAL JOINT Right 8/15/2018    Procedure: FUSION, MTP JOINT;  Surgeon: Ace Centeno DPM;  Location: [facility] OR;  Service: Podiatry;  Laterality: Right;  mini c-arm, possible autograft bone harvest from calcaneus, Arthrex locking plate (Clair notified)    FUSION OF METATARSOPHALANGEAL JOINT Left 1/4/2023    Procedure: FUSION, MTP JOINT;  Surgeon: Ace Centeno DPM;  Location: [facility] OR;  Service: Podiatry;  Laterality: Left;  mini c-arm, Arthrex locking plate and screws shital confirmed CW    HEMORRHOID SURGERY      HYSTERECTOMY      full 2009    OOPHORECTOMY         Family History   Problem Relation Name Age of Onset    Graves' disease Mother      Arthritis Father      Prostate cancer Father      Diabetes Sister         Social History     Socioeconomic History    Marital status:    Tobacco " Use    Smoking status: Never   Substance and Sexual Activity    Alcohol use: No    Drug use: No    Sexual activity: Yes     Social Drivers of Health     Financial Resource Strain: Low Risk  (12/9/2022)    Received from [facility]    Overall Financial Resource Strain (CARDIA)     Difficulty of Paying Living Expenses: Not very hard   Food Insecurity: No Food Insecurity (12/9/2022)    Received from [facility]    Hunger Vital Sign     Worried About Running Out of Food in the Last Year: Never true     Ran Out of Food in the Last Year: Never true   Transportation Needs: No Transportation Needs (12/9/2022)    Received from [facility]    PRAPARE - Transportation     Lack of Transportation (Medical): No     Lack of Transportation (Non-Medical): No   Physical Activity: Insufficiently Active (12/9/2022)    Received from [facility]    Exercise Vital Sign     Days of Exercise per Week: 2 days     Minutes of Exercise per Session: 20 min   Stress: No Stress Concern Present (12/9/2022)    Received from [facility]    [place] [employer] - Occupational Stress Questionnaire     Feeling of Stress : Only a little   Housing Stability: Low Risk  (12/9/2022)    Received from [facility]    Housing Stability Vital Sign     Unable to Pay for Housing in the Last Year: No     Number of Places Lived in the Last Year: 1     Unstable Housing in the Last Year: No       Current Medications[1]    Review of patient's allergies indicates:  No Known Allergies      Review of Systems   Constitutional: Negative for chills, fever and malaise/fatigue.   HENT:  Negative for congestion and hearing loss.    Cardiovascular:  Negative for chest pain and claudication.   Respiratory:  Negative for cough and shortness of breath.    Musculoskeletal:  Negative for back pain and muscle weakness.   Gastrointestinal:  Negative for nausea and vomiting.   Neurological:  Positive for paresthesias. Negative for numbness.   Psychiatric/Behavioral:   Negative for altered mental status.         Objective:     Physical Exam  Constitutional:       General: She is not in acute distress.     Appearance: She is not ill-appearing.   Cardiovascular:      Pulses:           Dorsalis pedis pulses are 2+ on the right side and 2+ on the left side.        Posterior tibial pulses are 1+ on the right side and 1+ on the left side.   Musculoskeletal:      Comments: No pain on palpation overlying the 1 MTP region of the right foot with no available 1 MTP range motion.  There is no localized edema to this area.  There is mild to moderate localized pain on palpation directly overlying the navicular tuberosity of the right foot.  There is no pain with active range motion or manual muscle strength testing to the right foot or ankle.    Semi rigid pes planus foot structure bilateral foot.    No available 1 MTP range motion left foot.  There is some mild pain on palpation described as pins and needles and sharp in nature commencing overlying the dorsal medial mid aspect of the 1st ray adjacent to the palpable underlying hardware that is seems to somewhat radiate distally.  In addition there is some pain on palpation to the distal 3rd and 4th intermetatarsal space of the left foot.  Negative Lachman test to the lesser toes 2-5 left foot.  There is a moderate sized tailor's bunion to left foot.   Skin:     General: Skin is warm.      Capillary Refill: Capillary refill takes less than 2 seconds.      Findings: No ecchymosis or erythema.      Nails: There is no clubbing.   Neurological:      Mental Status: She is alert.           Assessment:      Encounter Diagnoses   Name Primary?    Bilateral foot pain Yes    History of foot surgery     [place]ar[place]'s neuroma, left      Plan:     Isi was seen today for foot pain.    Diagnoses and all orders for this visit:    Bilateral foot pain  -     X-Ray Foot Complete 3 view Bilateral; Future    History of foot surgery  -     X-Ray Foot Complete 3 view  Bilateral; Future    [place]'s neuroma, left    Other orders  -     methylPREDNISolone acetate injection 40 mg      I counseled the patient on her conditions, their implications and medical management.    We discussed obtaining a follow-up weight-bearing bilateral foot x-ray to assess for any significant underlying osseous pathology.  Patient appears to have some neuritis along the course of the dorsal medial cutaneous nerve adjacent to the previous surgical site of the previous 1 MTP fusion site.  In addition she has symptoms consistent with neuroma formation to the 3rd and 4th intermetatarsal space of the left foot.  We discussed treatment options such as shoe gear modifications, activity modifications, steroid injections and surgery if conservative care fails.  Patient elected receive a steroid injection today.    Risks and benefits of the steroid injection were discussed in detail.  With the patient's verbal consent the skin was prepped with alcohol overlying the distal dorsal 3rd and 4th intermetatarsal space followed by skin anesthesia with ethyl chloride.  Injected a mixture containing 40 mg of methylprednisolone mixed with 1 mL of Marcaine utilizing and injecting 1 mL of the above mixture into each intermetatarsal space described.  She tolerated the injections well without apparent complication.  Instructed to rest, ice and elevate p.r.n..    Patient was also instructed to wear good supportive tennis shoes to help alleviate the discomfort along the tibial tuberosity.  Does not.  Have any pain associated with the posterior tibial tendon.    RTC within 4 weeks to re-evaluate or p.r.n. as discussed.    Assisted per Zelalem Middleton DPM PGY 2    .            [1]   Current Outpatient Medications   Medication Sig Dispense Refill    dapagliflozin-metformin (XIGDUO XR) 5-1,000 mg Take 1 tablet by mouth every evening.      lisinopriL-hydrochlorothiazide (PRINZIDE,ZESTORETIC) 10-12.5 mg per tablet Take 1 tablet by mouth  once daily.      SYNTHROID 100 mcg tablet       tirzepatide (MOUNJARO) 15 mg/0.5 mL PnIj Inject 15 mg (one pen) into the skin every 7 (seven) days 2 mL 3     Current Facility-Administered Medications   Medication Dose Route Frequency Provider Last Rate Last Admin    sodium chloride 0.9% flush 10 mL  10 mL Intravenous PRN Ace Centeno, SIXTOM        sodium chloride 0.9% flush 10 mL  10 mL Intravenous PRN Ace Centeno, SIXTOM

## 2025-06-27 ENCOUNTER — PATIENT MESSAGE (OUTPATIENT)
Dept: PODIATRY | Facility: HOSPITAL | Age: 50
End: 2025-06-27
Payer: COMMERCIAL

## 2025-07-24 ENCOUNTER — OFFICE VISIT (OUTPATIENT)
Dept: PODIATRY | Facility: CLINIC | Age: 50
End: 2025-07-24
Payer: COMMERCIAL

## 2025-07-24 VITALS
HEIGHT: 67 IN | WEIGHT: 255.94 LBS | BODY MASS INDEX: 40.17 KG/M2 | HEART RATE: 76 BPM | SYSTOLIC BLOOD PRESSURE: 114 MMHG | DIASTOLIC BLOOD PRESSURE: 69 MMHG

## 2025-07-24 DIAGNOSIS — Z98.890 HISTORY OF FOOT SURGERY: ICD-10-CM

## 2025-07-24 DIAGNOSIS — M79.671 RIGHT FOOT PAIN: ICD-10-CM

## 2025-07-24 DIAGNOSIS — G57.62 MORTON'S NEUROMA, LEFT: Primary | ICD-10-CM

## 2025-07-24 PROCEDURE — 99213 OFFICE O/P EST LOW 20 MIN: CPT | Mod: S$GLB,,, | Performed by: PODIATRIST

## 2025-07-24 PROCEDURE — 3044F HG A1C LEVEL LT 7.0%: CPT | Mod: CPTII,S$GLB,, | Performed by: PODIATRIST

## 2025-07-24 PROCEDURE — 1160F RVW MEDS BY RX/DR IN RCRD: CPT | Mod: CPTII,S$GLB,, | Performed by: PODIATRIST

## 2025-07-24 PROCEDURE — 3074F SYST BP LT 130 MM HG: CPT | Mod: CPTII,S$GLB,, | Performed by: PODIATRIST

## 2025-07-24 PROCEDURE — 3078F DIAST BP <80 MM HG: CPT | Mod: CPTII,S$GLB,, | Performed by: PODIATRIST

## 2025-07-24 PROCEDURE — 3008F BODY MASS INDEX DOCD: CPT | Mod: CPTII,S$GLB,, | Performed by: PODIATRIST

## 2025-07-24 PROCEDURE — 99999 PR PBB SHADOW E&M-EST. PATIENT-LVL III: CPT | Mod: PBBFAC,,, | Performed by: PODIATRIST

## 2025-07-24 PROCEDURE — 1159F MED LIST DOCD IN RCRD: CPT | Mod: CPTII,S$GLB,, | Performed by: PODIATRIST

## 2025-07-24 NOTE — PROGRESS NOTES
"Subjective:     Patient ID: Isi Herrera is a 49 y.o. female.    Chief Complaint: Follow-up (Left foot)    Presents complaining of bilateral foot pain that began within the past couple months.  She has a history of prediabetes in his taking Mounjaro as well as a combination drug with metformin.  She relates to some sensitivity as well as burning and tingling to the left foot.  She has some point tenderness pointing to the region of the arch of the right foot.  She is wearing flip-flops.  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  Denies any slips, trips or falls.  History of bilateral 1 MTP arthrodesis.    07/24/2025:  Patient relates that she is doing significantly better to both feet.  She has been applying Aspercreme along the medial right foot gradually since the last visit and it has been improving.  She also modified her activities in his wearing good supportive shoes.  Relates that the pain to left forefoot as significantly improved and has minimal discomfort status post steroid injection to the distal 3rd and 4th intermetatarsal spaces left foot.  Wearing flip-flops today despite saying that she wears good supportive shoe gear daily.    Vitals:    07/24/25 1515   BP: 114/69   Pulse: 76   Weight: 116.1 kg (255 lb 15.3 oz)   Height: 5' 7" (1.702 m)   PainSc: 0-No pain      Past Medical History:   Diagnosis Date    Thyroid disease     Hypothyroid       Past Surgical History:   Procedure Laterality Date    BUNIONECTOMY Right 2009    BUNIONECTOMY Left 1/4/2023    Procedure: BUNIONECTOMY;  Surgeon: Ace Centeno DPM;  Location: [facility] OR;  Service: Podiatry;  Laterality: Left;    FUSION OF METATARSOPHALANGEAL JOINT Right 8/15/2018    Procedure: FUSION, MTP JOINT;  Surgeon: Ace Centeno DPM;  Location: [facility] OR;  Service: Podiatry;  Laterality: Right;  mini c-arm, possible autograft bone harvest from calcaneus, Arthrex locking plate (Clair notified)    " FUSION OF METATARSOPHALANGEAL JOINT Left 1/4/2023    Procedure: FUSION, MTP JOINT;  Surgeon: Ace Centeno DPM;  Location: [facility];  Service: Podiatry;  Laterality: Left;  mini c-arm, Arthrex locking plate and screws shital confirmed CW    HEMORRHOID SURGERY      HYSTERECTOMY      full 2009    OOPHORECTOMY         Family History   Problem Relation Name Age of Onset    Graves' disease Mother      Arthritis Father      Prostate cancer Father      Diabetes Sister         Social History     Socioeconomic History    Marital status:    Tobacco Use    Smoking status: Never   Substance and Sexual Activity    Alcohol use: No    Drug use: No    Sexual activity: Yes     Social Drivers of Health     Financial Resource Strain: Low Risk  (12/9/2022)    Received from [facility]    Overall Financial Resource Strain (CARDIA)     Difficulty of Paying Living Expenses: Not very hard   Food Insecurity: No Food Insecurity (12/9/2022)    Received from [facility]    Hunger Vital Sign     Worried About Running Out of Food in the Last Year: Never true     Ran Out of Food in the Last Year: Never true   Transportation Needs: No Transportation Needs (12/9/2022)    Received from [facility]    PRAPARE - Transportation     Lack of Transportation (Medical): No     Lack of Transportation (Non-Medical): No   Physical Activity: Insufficiently Active (12/9/2022)    Received from [facility]    Exercise Vital Sign     Days of Exercise per Week: 2 days     Minutes of Exercise per Session: 20 min   Stress: No Stress Concern Present (12/9/2022)    Received from [facility]    [place] [place] [employer] - Occupational Stress Questionnaire     Feeling of Stress : Only a little   Housing Stability: Low Risk  (12/9/2022)    Received from [facility]    Housing Stability Vital Sign     Unable to Pay for Housing in the Last Year: No     Number of Places Lived in the Last Year: 1     Unstable Housing in the Last Year: No        Current Medications[1]    Review of patient's allergies indicates:  No Known Allergies      Review of Systems   Constitutional: Negative for chills, fever and malaise/fatigue.   HENT:  Negative for congestion and hearing loss.    Cardiovascular:  Negative for chest pain and claudication.   Respiratory:  Negative for cough and shortness of breath.    Musculoskeletal:  Negative for back pain and muscle weakness.   Gastrointestinal:  Negative for nausea and vomiting.   Neurological:  Positive for paresthesias. Negative for numbness.   Psychiatric/Behavioral:  Negative for altered mental status.         Objective:     Physical Exam  Constitutional:       General: She is not in acute distress.     Appearance: She is not ill-appearing.   Cardiovascular:      Pulses:           Dorsalis pedis pulses are 2+ on the right side and 2+ on the left side.        Posterior tibial pulses are 1+ on the right side and 1+ on the left side.   Musculoskeletal:      Comments: No pain on palpation overlying the 1 MTP region of the right foot with no available 1 MTP range motion.  There is no localized edema to this area.  There is mild to moderate localized pain on palpation directly overlying the navicular tuberosity of the right foot.  There is no pain with active range motion or manual muscle strength testing to the right foot or ankle.    Semi rigid pes planus foot structure bilateral foot.    No available 1 MTP range motion left foot.  There is some mild pain on palpation described as pins and needles and sharp in nature commencing overlying the dorsal medial mid aspect of the 1st ray adjacent to the palpable underlying hardware that is seems to somewhat radiate distally.  In addition there is some pain on palpation to the distal 3rd and 4th intermetatarsal space of the left foot.  Negative Lachman test to the lesser toes 2-5 left foot.  There is a moderate sized tailor's bunion to left foot.   Skin:     General: Skin is warm.       Capillary Refill: Capillary refill takes less than 2 seconds.      Findings: No ecchymosis or erythema.      Nails: There is no clubbing.   Neurological:      Mental Status: She is alert.           Assessment:      Encounter Diagnoses   Name Primary?    Marquise's neuroma, left Yes    History of foot surgery     Right foot pain      Plan:     Isi was seen today for follow-up.    Diagnoses and all orders for this visit:    Marquise's neuroma, left    History of foot surgery    Right foot pain      I counseled the patient on her conditions, their implications and medical management.    Reviewed previous bilateral foot weight-bearing x-ray in detail with the patient.  Fully remodeled and consolidated 1 MTP arthrodesis bilateral foot with intact fixation without signs of hardware loosening or failure.  No underlying fracture or dislocation.  There is some mild medial deviation of the left 2nd and 3rd toe at the MTP is with congruent deviation.  Enlarged navicular tuberosity bilateral right greater than left.  We discussed that she may have had some mild irritation to the tuberosity which may have been injured without her noting it.    Continue shoe gear modifications as discussed.  We discussed steroid injections for flare-ups for the neuroma pain to left 3rd and 4th intermetatarsal spaces as needed and if her pain worsens over time possible excision.    RTC p.r.n. as discussed      A portion of this note was generated by voice recognition software and may contain spelling and grammar errors.         [1]   Current Outpatient Medications   Medication Sig Dispense Refill    dapagliflozin-metformin (XIGDUO XR) 5-1,000 mg Take 1 tablet by mouth every evening.      lisinopriL-hydrochlorothiazide (PRINZIDE,ZESTORETIC) 10-12.5 mg per tablet Take 1 tablet by mouth once daily.      SYNTHROID 100 mcg tablet       tirzepatide (MOUNJARO) 15 mg/0.5 mL PnIj Inject 15 mg (one pen) into the skin every 7 (seven) days 2 mL 3     tirzepatide (MOUNJARO) 15 mg/0.5 mL PnIj Inject 15 mg into the skin every 7 (seven) days 2 mL 3     Current Facility-Administered Medications   Medication Dose Route Frequency Provider Last Rate Last Admin    sodium chloride 0.9% flush 10 mL  10 mL Intravenous PRN Ace Centeno, SIXTOM        sodium chloride 0.9% flush 10 mL  10 mL Intravenous PRN Ace Centeno, SIXTOM

## (undated) DEVICE — GLOVE PROTEXIS HYDROGEL SZ7.5

## (undated) DEVICE — NDL 18GA X1 1/2 REG BEVEL

## (undated) DEVICE — GUIDEWIRE DE TROCAR .045IN
Type: IMPLANTABLE DEVICE | Site: FOOT | Status: NON-FUNCTIONAL
Removed: 2023-01-04

## (undated) DEVICE — BNDG COFLEX FOAM LF2 ST 4X5YD

## (undated) DEVICE — WIRE K SMALL BALL BB-TAK
Type: IMPLANTABLE DEVICE | Site: FOOT | Status: NON-FUNCTIONAL
Removed: 2023-01-04

## (undated) DEVICE — BIT DRILL MTP 2 MM

## (undated) DEVICE — IMPLANTABLE DEVICE
Type: IMPLANTABLE DEVICE | Site: FOOT | Status: NON-FUNCTIONAL
Removed: 2023-01-04

## (undated) DEVICE — SEE L#120831

## (undated) DEVICE — SEE MEDLINE ITEM 156953

## (undated) DEVICE — GAUZE SPONGE 4X4 12PLY

## (undated) DEVICE — TAPE COTTON ELASTIC 4IN 5YD

## (undated) DEVICE — SEE MEDLINE ITEM 146308

## (undated) DEVICE — SUT PROLENE 4-0 PS2 18 BLUE

## (undated) DEVICE — BLADE SAGITTA 5/BX

## (undated) DEVICE — Device

## (undated) DEVICE — PAD PREP CUFFED NS 24X48IN

## (undated) DEVICE — GLOVE BIOGEL PI MICRO INDIC 8

## (undated) DEVICE — WALKER BOOT SHORT UNIVERSAL LG

## (undated) DEVICE — SUT 4-0 ETHILON 18 PS-2

## (undated) DEVICE — REAMER METATARSAL 22MM

## (undated) DEVICE — STOCKINET TUBULAR 1 PLY 6X60IN

## (undated) DEVICE — SUT VICRYL CTD 2-0 GI 27 SH

## (undated) DEVICE — SEE MEDLINE ITEM 152529

## (undated) DEVICE — BLADE SCALP OPHTL BEVEL STR

## (undated) DEVICE — SUT VICRYL 3-0 27 SH

## (undated) DEVICE — SUT VICRYL 4-0 ANTIBACT

## (undated) DEVICE — REAMER PHALANGEAL 22MM

## (undated) DEVICE — WALKER BOOT SHORT UNIV MED

## (undated) DEVICE — COVER OVERHEAD SURG LT BLUE

## (undated) DEVICE — DRESSING XEROFORM FOIL PK 1X8

## (undated) DEVICE — SPONGE GAUZE 16PLY 4X4

## (undated) DEVICE — DRAPE MINI C-ARM 54 X 64

## (undated) DEVICE — BLADE SURG #15 CARBON STEEL

## (undated) DEVICE — SCREW LP VA 3X16MM LOK TI
Type: IMPLANTABLE DEVICE | Site: TOE | Status: NON-FUNCTIONAL
Removed: 2018-08-15

## (undated) DEVICE — ELECTRODE REM PLYHSV RETURN 9

## (undated) DEVICE — PAD CAST SPECIALIST STRL 4

## (undated) DEVICE — DRAPE STERI-DRAPE 1000 17X11IN

## (undated) DEVICE — DRESSING XEROFORM 1X8IN

## (undated) DEVICE — BANDAGE ESMARK ELASTIC ST 4X9

## (undated) DEVICE — TOURNIQUET SB QC SP 18X4IN

## (undated) DEVICE — SEE MEDLINE ITEM 152522

## (undated) DEVICE — BIT DRILL 2.2MM CMP FT CALIB

## (undated) DEVICE — SYR 10CC LUER LOCK

## (undated) DEVICE — WIRE K SMALL BALL BB-TAK
Type: IMPLANTABLE DEVICE | Site: TOE | Status: NON-FUNCTIONAL
Removed: 2018-08-15

## (undated) DEVICE — NDL HYPO REG 25G X 1 1/2

## (undated) DEVICE — PAD PREP 50/CA

## (undated) DEVICE — GLOVE BIOGEL ECLIPSE SZ 8

## (undated) DEVICE — GAUZE SPONGE 4'X4 12 PLY

## (undated) DEVICE — TAPE ADH MEDIPORE 4 X 10YDS

## (undated) DEVICE — PROFILE DRILL MINI CMP FT

## (undated) DEVICE — APPLICATOR CHLORAPREP ORN 26ML

## (undated) DEVICE — BLADE SAG MIC FINE 9.5X25.5MM

## (undated) DEVICE — ALCOHOL 70% ISOP W/GREEN 16OZ

## (undated) DEVICE — SPONGE DERMACEA GAUZE 4X4

## (undated) DEVICE — SEE MEDLINE ITEM 157116

## (undated) DEVICE — PAD CAST SPECIALIST STRL 6

## (undated) DEVICE — GLOVE 8 PROTEXIS PI BLUE

## (undated) DEVICE — GUIDEWIRE TROCAR TIP.062
Type: IMPLANTABLE DEVICE | Site: TOE | Status: NON-FUNCTIONAL
Removed: 2018-08-15

## (undated) DEVICE — SEE MEDLINE ITEM 157117

## (undated) DEVICE — DRESSING AQUACEL SACRAL 9 X 9